# Patient Record
Sex: MALE | Race: WHITE | NOT HISPANIC OR LATINO | Employment: PART TIME | URBAN - METROPOLITAN AREA
[De-identification: names, ages, dates, MRNs, and addresses within clinical notes are randomized per-mention and may not be internally consistent; named-entity substitution may affect disease eponyms.]

---

## 2017-01-04 ENCOUNTER — GENERIC CONVERSION - ENCOUNTER (OUTPATIENT)
Dept: OTHER | Facility: OTHER | Age: 22
End: 2017-01-04

## 2018-06-21 ENCOUNTER — OFFICE VISIT (OUTPATIENT)
Dept: FAMILY MEDICINE CLINIC | Facility: CLINIC | Age: 23
End: 2018-06-21
Payer: COMMERCIAL

## 2018-06-21 VITALS
HEIGHT: 66 IN | DIASTOLIC BLOOD PRESSURE: 70 MMHG | HEART RATE: 69 BPM | TEMPERATURE: 98.1 F | WEIGHT: 139 LBS | BODY MASS INDEX: 22.34 KG/M2 | SYSTOLIC BLOOD PRESSURE: 122 MMHG | OXYGEN SATURATION: 97 %

## 2018-06-21 DIAGNOSIS — F17.210 CIGARETTE NICOTINE DEPENDENCE WITHOUT COMPLICATION: ICD-10-CM

## 2018-06-21 DIAGNOSIS — Z76.89 ESTABLISHING CARE WITH NEW DOCTOR, ENCOUNTER FOR: Primary | ICD-10-CM

## 2018-06-21 DIAGNOSIS — Z87.09 HISTORY OF ASTHMA: ICD-10-CM

## 2018-06-21 DIAGNOSIS — Z87.898 HISTORY OF SEIZURES: ICD-10-CM

## 2018-06-21 PROCEDURE — 99406 BEHAV CHNG SMOKING 3-10 MIN: CPT | Performed by: FAMILY MEDICINE

## 2018-06-21 PROCEDURE — 4004F PT TOBACCO SCREEN RCVD TLK: CPT | Performed by: FAMILY MEDICINE

## 2018-06-21 PROCEDURE — 99203 OFFICE O/P NEW LOW 30 MIN: CPT | Performed by: FAMILY MEDICINE

## 2018-06-21 RX ORDER — TAMSULOSIN HYDROCHLORIDE 0.4 MG/1
CAPSULE ORAL
Refills: 0 | COMMUNITY
Start: 2018-05-23 | End: 2018-06-21

## 2018-06-21 RX ORDER — OXYCODONE HYDROCHLORIDE AND ACETAMINOPHEN 5; 325 MG/1; MG/1
TABLET ORAL
Refills: 0 | COMMUNITY
Start: 2018-05-23 | End: 2018-06-21

## 2018-06-21 NOTE — PATIENT INSTRUCTIONS
Cigarette Smoking and Your Health   WHAT YOU NEED TO KNOW:   What are the risks to my health if I smoke tobacco?  Nicotine and other chemicals found in tobacco damage every cell in your body  Even if you are a light smoker, you have an increased risk for cancer, heart disease, and lung disease  If you are pregnant or have diabetes, smoking increases your risk for complications  What are the benefits to my health if I stop smoking? · You decrease respiratory symptoms such as coughing, wheezing, and shortness of breath  · You reduce your risk for cancers of the lung, mouth, throat, kidney, bladder, pancreas, stomach, and cervix  If you already have cancer, you increase the benefits of chemotherapy  You also reduce your risk for cancer returning or a second cancer from developing  · You reduce your risk for heart disease, blood clots, heart attack, and stroke  · You reduce your risk for lung infections, and diseases such as pneumonia, asthma, chronic bronchitis, and emphysema  · Your circulation improves  More oxygen can be delivered to your body  If you have diabetes, you lower your risk for complications, such as kidney, artery, and eye diseases  You also lower your risk for nerve damage  Nerve damage can lead to amputations, poor vision, and blindness  · You improve your body's ability to heal and to fight infections  What are the health benefits to others if I stop smoking? Tobacco is harmful to nonsmokers who breathe in your secondhand smoke  The following are ways the health of others around you may improve when you stop smoking:  · You lower the risks for lung cancer and heart disease in nonsmoking adults  · If you are pregnant, you lower the risk for miscarriage, early delivery, low birth weight, and stillbirth  You also lower your baby's risk for SIDS, obesity, developmental delay, and neurobehavioral problems, such as ADHD       · If you have children, you lower their risk for ear infections, colds, pneumonia, bronchitis, and asthma  Where can I find more information and support to stop smoking? · [employer]  Phone: 2- 623 - 043-9865  Web Address: www [employer]  CARE AGREEMENT:   You have the right to help plan your care  Learn about your health condition and how it may be treated  Discuss treatment options with your caregivers to decide what care you want to receive  You always have the right to refuse treatment  The above information is an  only  It is not intended as medical advice for individual conditions or treatments  Talk to your doctor, nurse or pharmacist before following any medical regimen to see if it is safe and effective for you  © 2017 [address] Information is for End User's use only and may not be sold, redistributed or otherwise used for commercial purposes  All illustrations and images included in CareNotes® are the copyrighted property of [employer]  or [employer]

## 2018-06-21 NOTE — ASSESSMENT & PLAN NOTE
Also completely asymptomatic without any seizures in years, there were only ever 2 seizures in they were determined to be vasovagal   Unless he has any repeat seizures he does not need anything further regarding this

## 2018-06-21 NOTE — ASSESSMENT & PLAN NOTE
I strongly encouraged that the patient consider quitting smoking  He has thought about it and he thinks he is able to do it  He is not sure he is ready  Given the fact that he had asthma he is even more likely to developed emphysema as an adult  He does not have any symptoms now and the earlier he quits in his life the better he will be from a health standpoint  I advised that he let me know if at any point he wanted to try medication to help with cessation

## 2018-06-21 NOTE — PROGRESS NOTES
Assessment/Plan:    Cigarette nicotine dependence without complication  I strongly encouraged that the patient consider quitting smoking  He has thought about it and he thinks he is able to do it  He is not sure he is ready  Given the fact that he had asthma he is even more likely to developed emphysema as an adult  He does not have any symptoms now and the earlier he quits in his life the better he will be from a health standpoint  I advised that he let me know if at any point he wanted to try medication to help with cessation  History of asthma  Currently completely asymptomatic without need for any medication  History of seizures  Also completely asymptomatic without any seizures in years, there were only ever 2 seizures in they were determined to be vasovagal   Unless he has any repeat seizures he does not need anything further regarding this  Diagnoses and all orders for this visit:    Establishing care with new doctor, encounter for    Cigarette nicotine dependence without complication    History of asthma    History of seizures          Subjective:      Patient ID: Angie Winters is a 25 y o  male      The pt is new to the office here to establish care  He had been seen his pediatrician regularly up until now  He is a generally healthy person without any medical problems not taking any chronic medications  He is a smoker  1/2 ppd  Has quit before - did not smoke for 2 years    He has a history of 2 individual seizures  There is documentation of this in his record through epic as he was worked up at [address]  He saw Neurology and Cardiology  The final diagnosis with that these were vaso-vagal    He had asthma as a kid - last time he needed albuterol was about 4 years ago  Has been fine since then  Has not felt like he needed an inhaler with him for any reason  No seasonal allergies  No cp/sob          The following portions of the patient's history were reviewed and updated as appropriate: allergies, current medications, past family history, past medical history, past social history, past surgical history and problem list     Review of Systems   HENT: Negative for postnasal drip, rhinorrhea and sneezing  Respiratory: Negative for chest tightness and shortness of breath  Cardiovascular: Negative for chest pain  Allergic/Immunologic: Negative for environmental allergies  Neurological: Negative for headaches  Objective:  Vitals:    06/21/18 1042   BP: 122/70   Pulse: 69   Temp: 98 1 °F (36 7 °C)   SpO2: 97%   Weight: 63 kg (139 lb)   Height: 5' 6" (1 676 m)      Physical Exam   Constitutional: He is oriented to person, place, and time  He appears well-developed and well-nourished  HENT:   Head: Normocephalic and atraumatic  Right Ear: External ear normal    Left Ear: External ear normal    Nose: Nose normal    Mouth/Throat: Oropharynx is clear and moist    Eyes: Conjunctivae and EOM are normal  Pupils are equal, round, and reactive to light  Neck: Normal range of motion  Neck supple  Cardiovascular: Normal rate, regular rhythm and normal heart sounds  Pulmonary/Chest: Effort normal and breath sounds normal    Musculoskeletal: Normal range of motion  He exhibits no edema  Neurological: He is alert and oriented to person, place, and time  Skin: Skin is warm, dry and intact  Psychiatric: He has a normal mood and affect  His behavior is normal  Judgment and thought content normal    Nursing note and vitals reviewed

## 2018-09-26 ENCOUNTER — OFFICE VISIT (OUTPATIENT)
Dept: FAMILY MEDICINE CLINIC | Facility: CLINIC | Age: 23
End: 2018-09-26
Payer: COMMERCIAL

## 2018-09-26 VITALS
DIASTOLIC BLOOD PRESSURE: 80 MMHG | BODY MASS INDEX: 21.05 KG/M2 | HEIGHT: 66 IN | TEMPERATURE: 99.1 F | WEIGHT: 131 LBS | OXYGEN SATURATION: 91 % | SYSTOLIC BLOOD PRESSURE: 122 MMHG | HEART RATE: 88 BPM

## 2018-09-26 DIAGNOSIS — J01.00 ACUTE NON-RECURRENT MAXILLARY SINUSITIS: ICD-10-CM

## 2018-09-26 DIAGNOSIS — J45.41 MODERATE PERSISTENT ASTHMA WITH EXACERBATION: Primary | ICD-10-CM

## 2018-09-26 PROCEDURE — 99214 OFFICE O/P EST MOD 30 MIN: CPT | Performed by: FAMILY MEDICINE

## 2018-09-26 PROCEDURE — 3008F BODY MASS INDEX DOCD: CPT | Performed by: FAMILY MEDICINE

## 2018-09-26 RX ORDER — ALBUTEROL SULFATE 90 UG/1
2 AEROSOL, METERED RESPIRATORY (INHALATION) EVERY 6 HOURS PRN
Qty: 8.5 G | Refills: 0 | Status: SHIPPED | OUTPATIENT
Start: 2018-09-26 | End: 2019-09-28

## 2018-09-26 RX ORDER — PREDNISONE 10 MG/1
TABLET ORAL
Qty: 40 TABLET | Refills: 0 | Status: SHIPPED | OUTPATIENT
Start: 2018-09-26 | End: 2019-01-22 | Stop reason: ALTCHOICE

## 2018-09-26 RX ORDER — AMOXICILLIN AND CLAVULANATE POTASSIUM 875; 125 MG/1; MG/1
1 TABLET, FILM COATED ORAL EVERY 12 HOURS SCHEDULED
Qty: 20 TABLET | Refills: 0 | Status: SHIPPED | OUTPATIENT
Start: 2018-09-26 | End: 2018-10-06

## 2018-09-26 NOTE — PATIENT INSTRUCTIONS
Rhinosinusitis   WHAT YOU NEED TO KNOW:   Rhinosinusitis (RS) is inflammation of your nose and sinuses  It commonly begins as a virus, often as a common cold  Viruses usually last 7 to 10 days and do not need treatment  When the virus does not get better on its own, you may have bacterial RS  This means that bacteria have begun to grow inside your sinuses  Acute RS lasts less than 4 weeks  Chronic RS lasts 12 weeks or more  Recurrent RS is when you have 4 or more episodes of RS in one year  DISCHARGE INSTRUCTIONS:   Return to the emergency department if:   · Your eye and eyelid are red, swollen, and painful  · You cannot open your eye  · You have double vision or you cannot see  · Your eyeball bulges out or you cannot move your eye  · You are more sleepy than normal or you notice changes in your ability to think, move, or talk  · You have a stiff neck, a fever, or a bad headache  · You have swelling of your forehead or scalp  Contact your healthcare provider if:   · Your symptoms are worse or do not improve after 3 to 5 days of treatment  · You have questions or concerns about your condition or care  Medicines: You may need any of the following:  · Acetaminophen  decreases pain and fever  It is available without a doctor's order  Ask how much to take and how often to take it  Follow directions  Acetaminophen can cause liver damage if not taken correctly  · NSAIDs , such as ibuprofen, help decrease swelling, pain, and fever  This medicine is available with or without a doctor's order  NSAIDs can cause stomach bleeding or kidney problems in certain people  If you take blood thinner medicine, always ask your healthcare provider if NSAIDs are safe for you  Always read the medicine label and follow directions  · Nasal steroid sprays  decrease inflammation in your nose and sinuses  · Decongestants  reduce swelling and drain mucus in the nose and sinuses   They may help you breathe easier  · Antihistamines  dry mucus in the nose and relieve sneezing  · Antibiotics  treat a bacterial infection and may be needed if your symptoms do not improve or they get worse  · Take your medicine as directed  Contact your healthcare provider if you think your medicine is not helping or if you have side effects  Tell him or her if you are allergic to any medicine  Keep a list of the medicines, vitamins, and herbs you take  Include the amounts, and when and why you take them  Bring the list or the pill bottles to follow-up visits  Carry your medicine list with you in case of an emergency  Self-care:   · Rinse your sinuses  Use a sinus rinse device to rinse your nasal passages with a saline (salt water) solution  This will help thin the mucus in your nose and rinse away pollen and dirt  It will also help reduce swelling so you can breathe normally  Ask your healthcare provider how often to do this  · Breathe in steam   Heat a bowl of water until you see steam  Lean over the bowl and make a tent over your head with a large towel  Breathe deeply for about 20 minutes  Be careful not to get too close to the steam or burn yourself  Do this 3 times a day  You can also breathe deeply when you take a hot shower  · Sleep with your head elevated  Place an extra pillow under your head before you go to sleep to help your sinuses drain  · Drink liquids as directed  Ask your healthcare provider how much liquid to drink each day and which liquids are best for you  Liquids will thin the mucus in your nose and help it drain  Avoid drinks that contain alcohol or caffeine  · Do not smoke, and avoid secondhand smoke  Nicotine and other chemicals in cigarettes and cigars can make your symptoms worse  Ask your healthcare provider for information if you currently smoke and need help to quit  E-cigarettes or smokeless tobacco still contain nicotine   Talk to your healthcare provider before you use these products  Follow up with your healthcare provider as directed: Follow up if your symptoms are worse or not better after 3 to 5 days of treatment  Write down your questions so you remember to ask them during your visits  © 2017 [address] Information is for End User's use only and may not be sold, redistributed or otherwise used for commercial purposes  All illustrations and images included in CareNotes® are the copyrighted property of [employer]  or [employer]  The above information is an  only  It is not intended as medical advice for individual conditions or treatments  Talk to your doctor, nurse or pharmacist before following any medical regimen to see if it is safe and effective for you

## 2018-09-26 NOTE — PROGRESS NOTES
Assessment/Plan:    No problem-specific Assessment & Plan notes found for this encounter  Diagnoses and all orders for this visit:    Moderate persistent asthma with exacerbation  -     albuterol (PROAIR HFA) 90 mcg/act inhaler; Inhale 2 puffs every 6 (six) hours as needed for wheezing  -     predniSONE 10 mg tablet; 5 tabs daily x3 days, 4 tabs daily x2 days, 3 tabs daily x2 days, 2 tabs daily x 2 days, 1 tab daily x2 days      Acute non-recurrent maxillary sinusitis  -     amoxicillin-clavulanate (AUGMENTIN) 875-125 mg per tablet; Take 1 tablet by mouth every 12 (twelve) hours for 10 days    I suspect that the patient has a bacterial sinusitis  I prescribed antibiotics and encouraged medication for sx relief  Rest and fluids encouraged as well  He also is wheezing significantly  I am going to write him for an inhaler as well as a steroid taper  If his symptoms worsen he will let me know or go to the emergency room  Subjective:   Chief Complaint   Patient presents with    Cold Like Symptoms     started on Friday  c/o runny nose, nasal congestion, and persistent cough with green sputum production  Patient ID: Richard Rodriguez is a 25 y o  male      The pt is here because he has been sick for 5 days  + sinus pain/pressure  + ear pain and pressure  + cough  + nasal congestion  + headaches  + PND  no sore throat  No fevers  He is wheezing a bit   He has a history of asthma but has not used an inhaler in about six years          The following portions of the patient's history were reviewed and updated as appropriate: allergies, current medications, past family history, past medical history, past social history, past surgical history and problem list     Review of Systems      Objective:  Vitals:    09/26/18 1316   BP: 122/80   Pulse: 88   Temp: 99 1 °F (37 3 °C)   SpO2: 91%   Weight: 59 4 kg (131 lb)   Height: 5' 6" (1 676 m)      Physical Exam   Constitutional: He is oriented to person, place, and time  Vital signs are normal  He appears well-developed and well-nourished  He does not appear ill  HENT:   Head: Normocephalic and atraumatic  Right Ear: Tympanic membrane, external ear and ear canal normal    Left Ear: Tympanic membrane, external ear and ear canal normal    Nose: Rhinorrhea present  Right sinus exhibits no maxillary sinus tenderness and no frontal sinus tenderness  Left sinus exhibits no maxillary sinus tenderness and no frontal sinus tenderness  Mouth/Throat: Mucous membranes are normal  Posterior oropharyngeal erythema present  No oropharyngeal exudate or posterior oropharyngeal edema  Eyes: Conjunctivae, EOM and lids are normal    Cardiovascular: Normal rate  Pulmonary/Chest: Effort normal  No respiratory distress  He has wheezes (diffuse)  Lymphadenopathy:     He has no cervical adenopathy  Neurological: He is alert and oriented to person, place, and time  Skin: Skin is warm, dry and intact  Psychiatric: He has a normal mood and affect

## 2019-01-22 ENCOUNTER — OFFICE VISIT (OUTPATIENT)
Dept: FAMILY MEDICINE CLINIC | Facility: CLINIC | Age: 24
End: 2019-01-22
Payer: COMMERCIAL

## 2019-01-22 VITALS
BODY MASS INDEX: 20.57 KG/M2 | WEIGHT: 128 LBS | OXYGEN SATURATION: 97 % | SYSTOLIC BLOOD PRESSURE: 86 MMHG | TEMPERATURE: 99 F | HEIGHT: 66 IN | HEART RATE: 76 BPM | DIASTOLIC BLOOD PRESSURE: 60 MMHG

## 2019-01-22 DIAGNOSIS — J01.00 ACUTE NON-RECURRENT MAXILLARY SINUSITIS: Primary | ICD-10-CM

## 2019-01-22 PROCEDURE — 99214 OFFICE O/P EST MOD 30 MIN: CPT | Performed by: FAMILY MEDICINE

## 2019-01-22 RX ORDER — BENZONATATE 200 MG/1
200 CAPSULE ORAL 3 TIMES DAILY PRN
Qty: 20 CAPSULE | Refills: 0 | Status: SHIPPED | OUTPATIENT
Start: 2019-01-22 | End: 2019-03-11 | Stop reason: ALTCHOICE

## 2019-01-22 RX ORDER — CEFUROXIME AXETIL 500 MG/1
500 TABLET ORAL EVERY 12 HOURS SCHEDULED
Qty: 20 TABLET | Refills: 0 | Status: SHIPPED | OUTPATIENT
Start: 2019-01-22 | End: 2019-02-01

## 2019-01-22 NOTE — PROGRESS NOTES
Subjective:   Chief Complaint   Patient presents with    Cough     PT IS HERE FOR COUGHING, RUNNY NOSE AND SORE THROAT X 1 WEEK  Patient ID: Montse Fuentes is a 26 y o  male  The pt is here because he has been sick for 1 week  + sinus pain/pressure  + ear pain and pressure  + cough  + nasal congestion  + headaches  + PND  + sore throat  No fevers        Cough         The following portions of the patient's history were reviewed and updated as appropriate: allergies, current medications, past family history, past medical history, past social history, past surgical history and problem list     Review of Systems   Respiratory: Positive for cough  Objective:  Vitals:    01/22/19 1133   BP: (!) 86/60   Pulse: 76   Temp: 99 °F (37 2 °C)   SpO2: 97%   Weight: 58 1 kg (128 lb)   Height: 5' 6" (1 676 m)      Physical Exam   Constitutional: He is oriented to person, place, and time  Vital signs are normal  He appears well-developed and well-nourished  He appears ill  HENT:   Head: Normocephalic and atraumatic  Right Ear: Tympanic membrane and external ear normal    Left Ear: Tympanic membrane and external ear normal    Nose: Mucosal edema present  No rhinorrhea or sinus tenderness  Right sinus exhibits maxillary sinus tenderness  Right sinus exhibits no frontal sinus tenderness  Left sinus exhibits maxillary sinus tenderness  Left sinus exhibits no frontal sinus tenderness  Mouth/Throat: Mucous membranes are normal  Posterior oropharyngeal erythema present  No oropharyngeal exudate, posterior oropharyngeal edema or tonsillar abscesses  Eyes: Conjunctivae and lids are normal    Pulmonary/Chest: Effort normal and breath sounds normal    Lymphadenopathy:     He has cervical adenopathy  Neurological: He is alert and oriented to person, place, and time  Skin: Skin is warm, dry and intact  Psychiatric: He has a normal mood and affect   Thought content normal          Assessment/Plan:    No problem-specific Assessment & Plan notes found for this encounter  Diagnoses and all orders for this visit:    Acute non-recurrent maxillary sinusitis  -     cefuroxime (CEFTIN) 500 mg tablet; Take 1 tablet (500 mg total) by mouth every 12 (twelve) hours for 10 days  -     benzonatate (TESSALON) 200 MG capsule; Take 1 capsule (200 mg total) by mouth 3 (three) times a day as needed for cough        I suspect that the patient has a bacterial sinusitis  I prescribed antibiotics and encouraged medication for sx relief  Rest and fluids encouraged as well

## 2019-03-11 ENCOUNTER — OFFICE VISIT (OUTPATIENT)
Dept: FAMILY MEDICINE CLINIC | Facility: CLINIC | Age: 24
End: 2019-03-11
Payer: COMMERCIAL

## 2019-03-11 VITALS
BODY MASS INDEX: 20.29 KG/M2 | SYSTOLIC BLOOD PRESSURE: 116 MMHG | HEART RATE: 84 BPM | WEIGHT: 126.25 LBS | HEIGHT: 66 IN | OXYGEN SATURATION: 97 % | TEMPERATURE: 98.5 F | DIASTOLIC BLOOD PRESSURE: 74 MMHG

## 2019-03-11 DIAGNOSIS — Z23 NEED FOR TDAP VACCINATION: ICD-10-CM

## 2019-03-11 DIAGNOSIS — J06.9 VIRAL UPPER RESPIRATORY ILLNESS: Primary | ICD-10-CM

## 2019-03-11 PROCEDURE — 99214 OFFICE O/P EST MOD 30 MIN: CPT | Performed by: FAMILY MEDICINE

## 2019-03-11 PROCEDURE — 3008F BODY MASS INDEX DOCD: CPT | Performed by: FAMILY MEDICINE

## 2019-03-11 PROCEDURE — 90715 TDAP VACCINE 7 YRS/> IM: CPT

## 2019-03-11 PROCEDURE — 4004F PT TOBACCO SCREEN RCVD TLK: CPT | Performed by: FAMILY MEDICINE

## 2019-03-11 PROCEDURE — 90471 IMMUNIZATION ADMIN: CPT

## 2019-03-11 RX ORDER — OSELTAMIVIR PHOSPHATE 75 MG/1
75 CAPSULE ORAL EVERY 12 HOURS SCHEDULED
Qty: 10 CAPSULE | Refills: 0 | Status: SHIPPED | OUTPATIENT
Start: 2019-03-11 | End: 2019-03-16

## 2019-03-11 RX ORDER — BENZONATATE 200 MG/1
200 CAPSULE ORAL 3 TIMES DAILY PRN
Qty: 30 CAPSULE | Refills: 0 | Status: SHIPPED | OUTPATIENT
Start: 2019-03-11 | End: 2019-09-28

## 2019-03-11 NOTE — PROGRESS NOTES
Subjective:   Chief Complaint   Patient presents with    Cold Like Symptoms     pt is here today wiht a runny nose, aches, and a cough producing yellow phlegm  Symptoms started yesterday  Pt did not take any meds  Pt did not have a flu shot this season  Pt had tdap vaccine today and completed depression screening which was negative  Patient ID: Tracy Mitchell is a 26 y o  male  The pt is here because he has been sick for 2 days  It started abruptly yesterday  He has cough, nasal congestion  No sinus pain  No ear pain  He is coughing  No wheezing or SOB  He does have body aches  Thinks he might have had a fever yesterday  He has felt f/c subjectively        The following portions of the patient's history were reviewed and updated as appropriate: allergies, current medications, past family history, past medical history, past social history, past surgical history and problem list     Review of Systems      Objective:  Vitals:    03/11/19 1501   BP: 116/74   Pulse: 84   Temp: 98 5 °F (36 9 °C)   SpO2: 97%   Weight: 57 3 kg (126 lb 4 oz)   Height: 5' 6" (1 676 m)      Physical Exam   Constitutional: Vital signs are normal  He appears well-developed and well-nourished  He does not appear ill  HENT:   Head: Normocephalic and atraumatic  Right Ear: Tympanic membrane, external ear and ear canal normal    Left Ear: Tympanic membrane, external ear and ear canal normal    Nose: Rhinorrhea present  Right sinus exhibits no maxillary sinus tenderness and no frontal sinus tenderness  Left sinus exhibits no maxillary sinus tenderness and no frontal sinus tenderness  Mouth/Throat: Mucous membranes are normal  Posterior oropharyngeal erythema present  No oropharyngeal exudate or posterior oropharyngeal edema  Eyes: Conjunctivae, EOM and lids are normal    Pulmonary/Chest: Effort normal and breath sounds normal    Lymphadenopathy:     He has no cervical adenopathy  Skin: Skin is warm, dry and intact  Assessment/Plan:    No problem-specific Assessment & Plan notes found for this encounter  Diagnoses and all orders for this visit:    Viral upper respiratory illness  -     oseltamivir (TAMIFLU) 75 mg capsule; Take 1 capsule (75 mg total) by mouth every 12 (twelve) hours for 5 days  -     benzonatate (TESSALON) 200 MG capsule; Take 1 capsule (200 mg total) by mouth 3 (three) times a day as needed for cough    I think the patient likely has a viral upper respiratory infection, not necessarily the flu  He does not have a fever today  I did give him a printed prescription for Tamiflu-if he does develop fevers over 101 within the next 24 hours I did advise he can start it  I also refilled his Tessalon Perles for cough      Need for Tdap vaccination  -     TDAP VACCINE GREATER THAN OR EQUAL TO 6YO IM

## 2019-03-11 NOTE — PATIENT INSTRUCTIONS
Influenza   WHAT YOU NEED TO KNOW:   Influenza (the flu) is an infection caused by the influenza virus  The flu is easily spread when an infected person coughs, sneezes, or has close contact with others  You may be able to spread the flu to others for 1 week or longer after signs or symptoms appear  DISCHARGE INSTRUCTIONS:   Call 911 for any of the following:   · You have trouble breathing, and your lips look purple or blue  · You have a seizure  Return to the emergency department if:   · You are dizzy, or you are urinating less or not at all  · You have a headache with a stiff neck, and you feel tired or confused  · You have new pain or pressure in your chest     · Your symptoms, such as shortness of breath, vomiting, or diarrhea, get worse  · Your symptoms, such as fever and coughing, seem to get better, but then get worse  Contact your healthcare provider if:   · You have new muscle pain or weakness  · You have questions or concerns about your condition or care  Medicines: You may need any of the following:  · Acetaminophen  decreases pain and fever  It is available without a doctor's order  Ask how much to take and how often to take it  Follow directions  Acetaminophen can cause liver damage if not taken correctly  · NSAIDs , such as ibuprofen, help decrease swelling, pain, and fever  This medicine is available with or without a doctor's order  NSAIDs can cause stomach bleeding or kidney problems in certain people  If you take blood thinner medicine, always ask your healthcare provider if NSAIDs are safe for you  Always read the medicine label and follow directions  · Antivirals  help fight a viral infection  · Take your medicine as directed  Contact your healthcare provider if you think your medicine is not helping or if you have side effects  Tell him or her if you are allergic to any medicine  Keep a list of the medicines, vitamins, and herbs you take   Include the amounts, and when and why you take them  Bring the list or the pill bottles to follow-up visits  Carry your medicine list with you in case of an emergency  Rest  as much as you can to help you recover  Drink liquids as directed  to help prevent dehydration  Ask how much liquid to drink each day and which liquids are best for you  Prevent the spread of influenza:   · Wash your hands often  Use soap and water  Wash your hands after you use the bathroom, change a child's diapers, or sneeze  Wash your hands before you prepare or eat food  Use gel hand cleanser when soap and water are not available  Do not touch your eyes, nose, or mouth unless you have washed your hands first            · Cover your mouth when you sneeze or cough  Cough into a tissue or the bend of your arm  · Clean shared items with a germ-killing   Clean table surfaces, doorknobs, and light switches  Do not share towels, silverware, and dishes with people who are sick  Wash bed sheets, towels, silverware, and dishes with soap and water  · Wear a mask  over your mouth and nose if you are sick or are near anyone who is sick  · Stay away from others  if you are sick  · Influenza vaccine  helps prevent influenza (flu)  Everyone older than 6 months should get a yearly influenza vaccine  Get the vaccine as soon as it is available, usually in September or October each year  Follow up with your healthcare provider as directed:  Write down your questions so you remember to ask them during your visits  © 2017 [address] Information is for End User's use only and may not be sold, redistributed or otherwise used for commercial purposes  All illustrations and images included in CareNotes® are the copyrighted property of [employer] , [employer]  or [employer] Hermosillo  The above information is an  only  It is not intended as medical advice for individual conditions or treatments   Talk to your doctor, nurse or pharmacist before following any medical regimen to see if it is safe and effective for you  Upper Respiratory Infection, Ambulatory Care   GENERAL INFORMATION:   An upper respiratory infection  is also called a common cold  It can affect your nose, throat, ears, and sinuses  Common symptoms include the following:   · Runny or stuffy nose    · Sneezing and coughing    · Sore throat or hoarseness    · Red, watery, and sore eyes    · Tiredness or restlessness    · Chills and fever    · Headache, body aches, or sore muscles  Seek immediate care for the following symptoms:   · Headaches or a stiff neck    · Bright lights hurt your eyes    · Chest pain or trouble breathing  Treatment for an upper respiratory infection  may include any of the following:  · Decongestants  help decrease nasal congestion and improve your breathing  Do not use decongestant sprays for more than a few days  · Cough suppressants  help decrease coughing  Ask your healthcare provider which type of cough medicine is best for you  Some cough medicines need a doctor's order  · NSAIDs  help decrease swelling and pain or fever  This medicine is available with or without a doctor's order  NSAIDs can cause stomach bleeding or kidney problems in certain people  If you take blood thinner medicine, always ask your healthcare provider if NSAIDs are safe for you  Always read the medicine label and follow directions  Care for an upper respiratory infection:   · Rest  until your fever is gone or you feel better  · Drink liquids as directed to prevent dehydration  You may need to drink 8 to 10 cups of liquid each day  Good liquids to drink include water, ginger ale, tea, or fruit juices  · Gargle  with warm salt water to help your sore throat feel better  Mix ¼ teaspoon salt with 1 cup warm water  You may also suck on hard candy or throat lozenges  · Saline nasal drops  help loosen your nasal congestion   They can be bought without a doctor's order     · Take a warm bath or shower  to help decrease body aches and help you breathe easier  · Use a cool-mist humidifier  to increase air moisture and make it easier for you to breathe  Prevent the spread of germs:   · Avoid others for the first 2 to 3 days of your cold  Germs are easily spread during this time  · Do not share food, drinks,  towels, or personal items with others  · Wash your hands often  Use soap and water  Wash your hands after you use the bathroom, change a child's diapers, or sneeze  Wash your hands before you prepare or eat food  Cover your mouth and nose with a tissue when you sneeze or cough  Follow up with your healthcare provider as directed:  Write down your questions so you remember to ask them during your visits  CARE AGREEMENT:   You have the right to help plan your care  Learn about your health condition and how it may be treated  Discuss treatment options with your caregivers to decide what care you want to receive  You always have the right to refuse treatment  The above information is an  only  It is not intended as medical advice for individual conditions or treatments  Talk to your doctor, nurse or pharmacist before following any medical regimen to see if it is safe and effective for you  © 2014 [address] is for End User's use only and may not be sold, redistributed or otherwise used for commercial purposes  All illustrations and images included in CareNotes® are the copyrighted property of A D A M [employer]  or Silver[employer] Hermosillo

## 2019-07-02 ENCOUNTER — OFFICE VISIT (OUTPATIENT)
Dept: FAMILY MEDICINE CLINIC | Facility: CLINIC | Age: 24
End: 2019-07-02
Payer: COMMERCIAL

## 2019-07-02 VITALS
WEIGHT: 125.5 LBS | SYSTOLIC BLOOD PRESSURE: 110 MMHG | HEART RATE: 91 BPM | OXYGEN SATURATION: 97 % | HEIGHT: 66 IN | TEMPERATURE: 98.9 F | DIASTOLIC BLOOD PRESSURE: 50 MMHG | BODY MASS INDEX: 20.17 KG/M2

## 2019-07-02 DIAGNOSIS — J02.9 ACUTE VIRAL PHARYNGITIS: Primary | ICD-10-CM

## 2019-07-02 LAB — S PYO AG THROAT QL: NEGATIVE

## 2019-07-02 PROCEDURE — 3008F BODY MASS INDEX DOCD: CPT | Performed by: FAMILY MEDICINE

## 2019-07-02 PROCEDURE — 4004F PT TOBACCO SCREEN RCVD TLK: CPT | Performed by: FAMILY MEDICINE

## 2019-07-02 PROCEDURE — 87880 STREP A ASSAY W/OPTIC: CPT | Performed by: FAMILY MEDICINE

## 2019-07-02 PROCEDURE — 99213 OFFICE O/P EST LOW 20 MIN: CPT | Performed by: FAMILY MEDICINE

## 2019-07-02 RX ORDER — PREDNISONE 10 MG/1
TABLET ORAL
Qty: 20 TABLET | Refills: 0 | Status: SHIPPED | OUTPATIENT
Start: 2019-07-02 | End: 2019-09-27 | Stop reason: HOSPADM

## 2019-07-02 NOTE — PROGRESS NOTES
Subjective:   Chief Complaint   Patient presents with    Sore Throat     pt here today with sore throat for the past 4 days  Pt said he took cough medicine and that has been helping, but when it wears off, his sore throat comes back  Pt did have some aches yesterday  Patient ID: Summer Bashir is a 26 y o  male  The patient is here today because he has had a very sore throat for 3 days  no fevers  Hurts to swallow  no cough  + achy and fatigued  Feels sick  Does not feel like allergies/PND        The following portions of the patient's history were reviewed and updated as appropriate: allergies, current medications, past family history, past medical history, past social history, past surgical history and problem list     Review of Systems      Objective:  Vitals:    07/02/19 1017   BP: 110/50   Pulse: 91   Temp: 98 9 °F (37 2 °C)   SpO2: 97%   Weight: 56 9 kg (125 lb 8 oz)   Height: 5' 6" (1 676 m)      Physical Exam   Constitutional: He appears well-developed and well-nourished  He does not appear ill  No distress  HENT:   Mouth/Throat: Mucous membranes are normal  Posterior oropharyngeal edema and posterior oropharyngeal erythema present  No oropharyngeal exudate or tonsillar abscesses  Tonsils are 2+ on the right  Tonsils are 2+ on the left  No tonsillar exudate  Skin: Skin is warm and dry  No rash noted  Assessment/Plan:    No problem-specific Assessment & Plan notes found for this encounter  Diagnoses and all orders for this visit:    Acute viral pharyngitis  -     POCT rapid strepA  -     predniSONE 10 mg tablet; 4 tabs daily x2 days, 3 tabs daily x2 days, 2 tabs daily x 2 days, 1 tab daily x2 days        The patient has no fevers and his rapid strep is negative  I suspect this is viral in nature and put him on prednisone to help with his symptoms  If things change, especially if he develops fevers, he will call and let me know

## 2019-07-02 NOTE — PATIENT INSTRUCTIONS

## 2019-09-26 ENCOUNTER — HOSPITAL ENCOUNTER (INPATIENT)
Facility: HOSPITAL | Age: 24
LOS: 1 days | Discharge: HOME | DRG: 087 | End: 2019-09-27
Attending: SURGERY | Admitting: SURGERY
Payer: COMMERCIAL

## 2019-09-26 DIAGNOSIS — S06.5X9A SUBDURAL HEMATOMA, ACUTE (HCC): ICD-10-CM

## 2019-09-26 DIAGNOSIS — S01.312A: Primary | ICD-10-CM

## 2019-09-26 DIAGNOSIS — S02.119A OCCIPITAL FRACTURE (HCC): ICD-10-CM

## 2019-09-26 PROCEDURE — 99223 1ST HOSP IP/OBS HIGH 75: CPT | Performed by: SURGERY

## 2019-09-26 PROCEDURE — 99285 EMERGENCY DEPT VISIT HI MDM: CPT

## 2019-09-26 PROCEDURE — NC001 PR NO CHARGE: Performed by: SURGERY

## 2019-09-26 PROCEDURE — 12051 INTMD RPR FACE/MM 2.5 CM/<: CPT | Performed by: SURGERY

## 2019-09-26 PROCEDURE — 0HQ3XZZ REPAIR LEFT EAR SKIN, EXTERNAL APPROACH: ICD-10-PCS | Performed by: SURGERY

## 2019-09-26 RX ORDER — ACETAMINOPHEN 325 MG/1
975 TABLET ORAL EVERY 8 HOURS SCHEDULED
Status: DISCONTINUED | OUTPATIENT
Start: 2019-09-27 | End: 2019-09-27 | Stop reason: HOSPADM

## 2019-09-26 RX ORDER — ONDANSETRON 2 MG/ML
4 INJECTION INTRAMUSCULAR; INTRAVENOUS EVERY 4 HOURS PRN
Status: DISCONTINUED | OUTPATIENT
Start: 2019-09-26 | End: 2019-09-27 | Stop reason: HOSPADM

## 2019-09-26 RX ORDER — OXYCODONE HYDROCHLORIDE 5 MG/1
5 TABLET ORAL EVERY 4 HOURS PRN
Status: DISCONTINUED | OUTPATIENT
Start: 2019-09-26 | End: 2019-09-27 | Stop reason: HOSPADM

## 2019-09-26 RX ORDER — OXYCODONE HYDROCHLORIDE 10 MG/1
10 TABLET ORAL EVERY 4 HOURS PRN
Status: DISCONTINUED | OUTPATIENT
Start: 2019-09-26 | End: 2019-09-27 | Stop reason: HOSPADM

## 2019-09-26 RX ORDER — LIDOCAINE HYDROCHLORIDE 10 MG/ML
5 INJECTION, SOLUTION EPIDURAL; INFILTRATION; INTRACAUDAL; PERINEURAL ONCE
Status: COMPLETED | OUTPATIENT
Start: 2019-09-26 | End: 2019-09-26

## 2019-09-26 RX ORDER — NICOTINE 21 MG/24HR
1 PATCH, TRANSDERMAL 24 HOURS TRANSDERMAL DAILY
Status: DISCONTINUED | OUTPATIENT
Start: 2019-09-27 | End: 2019-09-26 | Stop reason: SDUPTHER

## 2019-09-26 RX ORDER — NICOTINE 21 MG/24HR
21 PATCH, TRANSDERMAL 24 HOURS TRANSDERMAL DAILY
Status: DISCONTINUED | OUTPATIENT
Start: 2019-09-26 | End: 2019-09-27 | Stop reason: HOSPADM

## 2019-09-26 RX ORDER — HYDROMORPHONE HCL/PF 1 MG/ML
0.5 SYRINGE (ML) INJECTION
Status: DISCONTINUED | OUTPATIENT
Start: 2019-09-26 | End: 2019-09-27 | Stop reason: HOSPADM

## 2019-09-26 RX ORDER — ACETAMINOPHEN 325 MG/1
975 TABLET ORAL ONCE
Status: COMPLETED | OUTPATIENT
Start: 2019-09-26 | End: 2019-09-26

## 2019-09-26 RX ADMIN — NICOTINE 21 MG: 21 PATCH, EXTENDED RELEASE TRANSDERMAL at 22:03

## 2019-09-26 RX ADMIN — ACETAMINOPHEN 975 MG: 325 TABLET ORAL at 21:15

## 2019-09-26 RX ADMIN — LIDOCAINE HYDROCHLORIDE 5 ML: 10 INJECTION, SOLUTION EPIDURAL; INFILTRATION; INTRACAUDAL; PERINEURAL at 21:16

## 2019-09-27 ENCOUNTER — TRANSITIONAL CARE MANAGEMENT (OUTPATIENT)
Dept: FAMILY MEDICINE CLINIC | Facility: CLINIC | Age: 24
End: 2019-09-27

## 2019-09-27 VITALS
SYSTOLIC BLOOD PRESSURE: 118 MMHG | TEMPERATURE: 98.6 F | HEART RATE: 71 BPM | BODY MASS INDEX: 19.61 KG/M2 | HEIGHT: 66 IN | OXYGEN SATURATION: 98 % | DIASTOLIC BLOOD PRESSURE: 58 MMHG | RESPIRATION RATE: 18 BRPM | WEIGHT: 122 LBS

## 2019-09-27 PROBLEM — S06.5XAA SUBDURAL HEMATOMA (HCC): Status: ACTIVE | Noted: 2019-09-27

## 2019-09-27 PROBLEM — S02.119A OCCIPITAL FRACTURE (HCC): Status: ACTIVE | Noted: 2019-09-27

## 2019-09-27 PROBLEM — S01.312A COMPLEX LACERATION OF LEFT EAR: Status: ACTIVE | Noted: 2019-09-27

## 2019-09-27 PROBLEM — S06.5X9A SUBDURAL HEMATOMA (HCC): Status: ACTIVE | Noted: 2019-09-27

## 2019-09-27 LAB
ANION GAP SERPL CALCULATED.3IONS-SCNC: 6 MMOL/L (ref 4–13)
BUN SERPL-MCNC: 10 MG/DL (ref 5–25)
CALCIUM SERPL-MCNC: 8.4 MG/DL (ref 8.3–10.1)
CHLORIDE SERPL-SCNC: 111 MMOL/L (ref 100–108)
CO2 SERPL-SCNC: 24 MMOL/L (ref 21–32)
CREAT SERPL-MCNC: 0.65 MG/DL (ref 0.6–1.3)
ERYTHROCYTE [DISTWIDTH] IN BLOOD BY AUTOMATED COUNT: 13 % (ref 11.6–15.1)
GFR SERPL CREATININE-BSD FRML MDRD: 138 ML/MIN/1.73SQ M
GLUCOSE SERPL-MCNC: 86 MG/DL (ref 65–140)
HCT VFR BLD AUTO: 39.4 % (ref 36.5–49.3)
HGB BLD-MCNC: 13 G/DL (ref 12–17)
MCH RBC QN AUTO: 29.8 PG (ref 26.8–34.3)
MCHC RBC AUTO-ENTMCNC: 33 G/DL (ref 31.4–37.4)
MCV RBC AUTO: 90 FL (ref 82–98)
PLATELET # BLD AUTO: 216 THOUSANDS/UL (ref 149–390)
PMV BLD AUTO: 9.5 FL (ref 8.9–12.7)
POTASSIUM SERPL-SCNC: 3.7 MMOL/L (ref 3.5–5.3)
RBC # BLD AUTO: 4.36 MILLION/UL (ref 3.88–5.62)
SODIUM SERPL-SCNC: 141 MMOL/L (ref 136–145)
WBC # BLD AUTO: 11.05 THOUSAND/UL (ref 4.31–10.16)

## 2019-09-27 PROCEDURE — NC001 PR NO CHARGE: Performed by: SURGERY

## 2019-09-27 PROCEDURE — G8988 SELF CARE GOAL STATUS: HCPCS

## 2019-09-27 PROCEDURE — G8989 SELF CARE D/C STATUS: HCPCS

## 2019-09-27 PROCEDURE — 80048 BASIC METABOLIC PNL TOTAL CA: CPT | Performed by: EMERGENCY MEDICINE

## 2019-09-27 PROCEDURE — 99255 IP/OBS CONSLTJ NEW/EST HI 80: CPT | Performed by: PHYSICIAN ASSISTANT

## 2019-09-27 PROCEDURE — 97163 PT EVAL HIGH COMPLEX 45 MIN: CPT

## 2019-09-27 PROCEDURE — G8987 SELF CARE CURRENT STATUS: HCPCS

## 2019-09-27 PROCEDURE — 97166 OT EVAL MOD COMPLEX 45 MIN: CPT

## 2019-09-27 PROCEDURE — 99238 HOSP IP/OBS DSCHRG MGMT 30/<: CPT | Performed by: SURGERY

## 2019-09-27 PROCEDURE — G8978 MOBILITY CURRENT STATUS: HCPCS

## 2019-09-27 PROCEDURE — G8980 MOBILITY D/C STATUS: HCPCS

## 2019-09-27 PROCEDURE — G8979 MOBILITY GOAL STATUS: HCPCS

## 2019-09-27 PROCEDURE — 85027 COMPLETE CBC AUTOMATED: CPT | Performed by: EMERGENCY MEDICINE

## 2019-09-27 RX ORDER — GINSENG 100 MG
1 CAPSULE ORAL 2 TIMES DAILY
Status: DISCONTINUED | OUTPATIENT
Start: 2019-09-27 | End: 2019-09-27 | Stop reason: HOSPADM

## 2019-09-27 RX ORDER — ACETAMINOPHEN 325 MG/1
975 TABLET ORAL EVERY 8 HOURS SCHEDULED
Qty: 30 TABLET | Refills: 0 | Status: SHIPPED | OUTPATIENT
Start: 2019-09-27 | End: 2019-10-09 | Stop reason: CLARIF

## 2019-09-27 RX ORDER — LEVETIRACETAM 500 MG/1
500 TABLET ORAL EVERY 12 HOURS SCHEDULED
Qty: 14 TABLET | Refills: 0 | Status: SHIPPED | OUTPATIENT
Start: 2019-09-27 | End: 2019-10-09 | Stop reason: CLARIF

## 2019-09-27 RX ADMIN — BACITRACIN ZINC 1 SMALL APPLICATION: 500 OINTMENT TOPICAL at 08:45

## 2019-09-27 RX ADMIN — ACETAMINOPHEN 975 MG: 325 TABLET ORAL at 05:35

## 2019-09-27 NOTE — ASSESSMENT & PLAN NOTE
Outside imaging personally reviewed and reviewed with attendings:  · CT head: left occipital nondisplaced skull fracture  · STAT CT head without contrast if decline in GCS >2pts/1h  · Pain control per primary team  · No neurosurgical intervention

## 2019-09-27 NOTE — PHYSICAL THERAPY NOTE
PHYSICAL THERAPY EVALUATION  NAME:  Lars Kirkland  DATE: 09/27/19    AGE:   26 y o  Mrn:   [id number]  ADMIT DX:  Occipital fracture ([facility] ) [S31 186H]  Subdural hematoma, acute ([facility] ) [B44 2Z4P]  Laceration of left ear [S01 312A]  Unspecified multiple injuries, initial encounter [T07  XXXA]    Past Medical History:   Diagnosis Date    Asthma     Lyme disease     Lyme disease     Personal history of methicillin resistant Staphylococcus aureus     Seizures ([facility] )     childhood       Past Surgical History:   Procedure Laterality Date    ANKLE SURGERY      ORIF ANKLE FRACTURE BIMALLEOLAR         Length Of Stay: 1    PHYSICAL THERAPY EVALUATION:        09/27/19 1036   Note Type   Note type Eval only   Pain Assessment   Pain Assessment No/denies pain   Home Living   Type of [address] [place] [address] One level;Stairs to enter with rails  (1 YADIRA )   Home Equipment   (none as per pt )   Additional Comments Pt reports living with parents and family who are able to assist pt if needed    Prior Function   Level of [place] Independent with ADLs and functional mobility   Lives With [facility] Help From Family;Friend(s)   ADL Assistance Independent   Falls in the last 6 months   (1 from tree stand )   Comments Pt denies the use of an AD for ambulation PTA    Restrictions/Precautions   Weight Bearing Precautions Per Order No   Other Precautions Multiple lines   Cognition   Overall Cognitive Status WFL   Arousal/Participation Alert   Orientation Level Oriented to person;Oriented to place;Oriented to time   Memory Within functional limits   Following Commands Follows all commands and directions without difficulty   RUE Assessment   RUE Assessment WFL   LUE Assessment   LUE Assessment WFL   RLE Assessment   RLE Assessment WFL   Strength RLE   RLE Overall Strength 5/5   LLE Assessment   LLE Assessment WFL   Strength LLE   LLE Overall Strength 5/5   Bed Mobility   Supine to Sit 7  Independent   Transfers   Sit to Stand 7  Independent   Stand to Sit 7  Independent   Ambulation/Elevation   Gait pattern Foward flexed   Gait Assistance 7  Independent   Balance   Static Sitting Normal   Static Standing Good   Ambulatory Good   Endurance Deficit   Endurance Deficit No   Activity Tolerance   Activity Tolerance Patient tolerated treatment well   Nurse Made Aware Pt appropriate to be seen and mobilize per nsg    Assessment   Prognosis Good   Problem List Decreased endurance   Assessment Pt is 26 y o  male seen for PT evaluation s/p admit to [facility] on 9/26/2019  Two pt identifiers were used to confirm  Pt presented s/p fall from tree stand  Pt ambulated back to home and had an unwitnessed loss of consciousness   Pt was admitted with a primary dx of: SDH, occipital fracture  Pt originally presented to [facility] however was transferred [facility] for further medical management   PT now consulted for assessment of mobility and d/c needs  Pt with Up in chair orders  Pts current co morbidities effecting treatment include: asthma, lyme disease, seizures, and personal factors including YADIRA home  Pts current clinical presentation is Evolving (medium complexity) due to Decreased activity tolerance compared to baseline, Continuous pulse oximetry monitoring     Prior to admission, pt was I with ambulation without the use of an AD as per pt  Upon evaluation, pt currently is requiring I for bed mobility; I for transfers and I for ambulation w/ no AD   Pt denies any lightheadedness or dizziness with ambulation  Pt presents at PT eval functioning below baseline and currently w/ overall mobility deficits 2* to: decreased endurance  At conclusion of PT session pt returned BTB with phone and call bell within reach  Pt denies any further questions at this time  PT is currently recommending home with family suopport  Pt/ family agreeable to plan and goals as stated on evaluation   D/C acute care PT at this time due to pt being at/ near baseline in terms of functional mobility  Pt denies any mobility or safety concerns     Barriers to Discharge None   Barriers to Discharge Comments Pt denies any mobility or safety concerns at d/c    Goals   Patient Goals " to go home"   Recommendation   Recommendation Home with family support   Equipment Recommended   (none )   PT - OK to Discharge Yes  (when medically cleared )   Modified [place] Scale   Modified [place] Scale 1   Barthel Index   Feeding 10   Bathing 5   Grooming Score 5   Dressing Score 10   Bladder Score 10   Bowels Score 10   Toilet Use Score 10   Transfers (Bed/Chair) Score 15   Mobility (Level Surface) Score 15   Stairs Score 10   Barthel Index Score 100   Allison Hickey, PT

## 2019-09-27 NOTE — UTILIZATION REVIEW
Initial Clinical Review    Admission: Date/Time/Statement: Inpatient Admission Orders (From admission, onward)     Ordered        09/26/19 2252  Inpatient Admission  Once                   Orders Placed This Encounter   Procedures    Inpatient Admission     Standing Status:   Standing     Number of Occurrences:   1     Order Specific Question:   Admitting Physician     Answer:   Bridgette Munoz     Order Specific Question:   Level of Care     Answer:   Level 2 Stepdown / HOT [14]     Order Specific Question:   Estimated length of stay     Answer:   More than 2 Midnights     Order Specific Question:   Certification     Answer:   I certify that inpatient services are medically necessary for this patient for a duration of greater than two midnights  See H&P and MD Progress Notes for additional information about the patient's course of treatment  ED Arrival Information     Expected Arrival Acuity Means of Arrival Escorted By Service Admission Type    9/26/2019 17:41 9/26/2019 20:04 Immediate Ambulance [facility] Trauma Urgent    Arrival Complaint    fall, occipital bone fx's, subdural hemorrhage         Chief Complaint   Patient presents with    Trauma     reference trauma charting     Assessment/Plan:   23y male to ED, transfer from [facility] for S/P Fall and Headache  He fell 8ft from a tree while hunting  Recalls event, did strike his head on landing and unwitnessed LOC  He found confused by grandfather  Pt diaphoretic and emesis x2 on route  Pt found with occipital fracture and subdural hematoma, thus transferred to [facility] for Neurosurgery evaluation  Admit Inpatient level of care for Subdural Hematoma, Occipital fracture and Left ear laceration   Stepdown High Observation Trauma Bed, Neurosurgery consult, frequent neuro checks, continue C-collar, pain control and laceration repair  9/27 Progress notes; pain control, Neurosurgery consult pend  9/27 Neurosurgery cons; STAT CT head without contrast if decline in GCS >2pts/1h or repeat scan if chemical DVT ppx is warranted  No neurosurgerical intervention   Defer Keppra use and pain control    GCS = 15    ED Triage Vitals   Temperature Pulse Respirations Blood Pressure SpO2   09/26/19 2009 09/26/19 2009 09/26/19 2009 09/26/19 2009 09/26/19 2009   98 4 °F (36 9 °C) 60 18 120/57 97 %      Temp Source Heart Rate Source Patient Position - Orthostatic VS BP Location FiO2 (%)   09/26/19 2009 09/26/19 2009 09/26/19 2009 09/27/19 0009 --   Oral Monitor Sitting Right arm       Pain Score       09/26/19 2009       7        Wt Readings from Last 1 Encounters:   09/27/19 55 3 kg (122 lb)     Additional Vital Signs:   09/27/19 08:20:49  98 6 °F (37 °C)  71  18  118/58  78  98 %      09/27/19 03:07:07  98 8 °F (37 1 °C)  77  18  118/60  79  96 %  None (Room air)    09/27/19 00:09:18  99 °F (37 2 °C)  63  18  105/73  84  98 %  None (Room air)    09/27/19 0004              None (Room air)    09/26/19 2300    63  21  105/51    98 %      09/26/19 2200    70  25Abnormal   120/59    99 %        Pertinent Labs/Diagnostic Test Results:   9/26 CT Head - CT head with minimal anterior falx SDH    Results from last 7 days   Lab Units 09/27/19  0516   WBC Thousand/uL 11 05*   HEMOGLOBIN g/dL 13 0   HEMATOCRIT % 39 4   PLATELETS Thousands/uL 216         Results from last 7 days   Lab Units 09/27/19  0516   SODIUM mmol/L 141   POTASSIUM mmol/L 3 7   CHLORIDE mmol/L 111*   CO2 mmol/L 24   ANION GAP mmol/L 6   BUN mg/dL 10   CREATININE mg/dL 0 65   EGFR ml/min/1 73sq m 138   CALCIUM mg/dL 8 4     Results from last 7 days   Lab Units 09/27/19  0516   GLUCOSE RANDOM mg/dL 86     ED Treatment:   Medication Administration from 09/26/2019 1741 to 09/26/2019 2348       Date/Time Order Dose Route Action     09/26/2019 2115 acetaminophen (TYLENOL) tablet 975 mg 975 mg Oral Given     09/26/2019 2203 nicotine (NICODERM CQ) 21 mg/24 hr TD 24 hr patch 21 mg 21 mg Transdermal Medication Applied     09/26/2019 2116 lidocaine (PF) (XYLOCAINE-MPF) 1 % injection 5 mL 5 mL Infiltration Given        Past Medical History:   Diagnosis Date    Asthma     Lyme disease     Lyme disease     Personal history of methicillin resistant Staphylococcus aureus     Seizures ([facility] [address] )     childhood     Present on Admission:   Cigarette nicotine dependence without complication    Admitting Diagnosis: Occipital fracture ([facility] ) [S02 119A]  Subdural hematoma, acute ([facility] ) [S06 5X9A]  Laceration of left ear [S01 312A]  Unspecified multiple injuries, initial encounter [T07  XXXA]     Age/Sex: 26 y o  male     Admission Orders:  IP CONSULT TO NEUROSURGERY  Neurological checks q1h    Current Facility-Administered Medications:  acetaminophen 975 mg Oral Q8H [address]   bacitracin 1 small application Topical BID   HYDROmorphone 0 5 mg Intravenous Q1H PRN   nicotine 21 mg Transdermal Daily   nicotine polacrilex 2 mg Oral Q2H PRN   ondansetron 4 mg Intravenous Q4H PRN   oxyCODONE 10 mg Oral Q4H PRN   oxyCODONE 5 mg Oral Q4H PRN     Network Utilization Review Department  Phone: [phone]; Fax [phone]  [email or web address]  ATTENTION: Please call with any questions or concerns to [phone]  and carefully listen to the prompts so that you are directed to the right person  Send all requests for admission clinical reviews, approved or denied determinations and any other requests to fax [phone]   All voicemails are confidential

## 2019-09-27 NOTE — DISCHARGE INSTRUCTIONS
Neurosurgery discharge instructions following traumatic head bleed:      Do not take any blood thinning medications for at least 2 weeks (ie  No Advil  No motrin  No ibuprofen  No Aleve  No Aspirin  No fishoil  No heparin  No antiplatelet / no anticoagulation medication)   Refrain from activity that increases chance of trauma to head or falls  Recommend you take fall precaution   No strenuous activity or sports  No heavy lifting (nothing more than 5-10 lbs)   Return to hospital Emergency Room if you experience worsening / new headache, nausea/vomiting, speech/vision change, seizure, confusion / mental status change, weakness, or other neurological changes  Laceration Care:   Seek medical attn if you develop fevers/chills/sweats or increased redness, swelling or drainage from the wound  Wash wound daily, gently with warm, soapy water  Do not scrub  Pat dry with clean towel  Do not immerse the wound in water (ie  No tub baths or swimming pools) until cleared by trauma     Follow up as directed for suture removal    Appointment is on 10/10/19 at 2:30pm

## 2019-09-27 NOTE — DISCHARGE SUMMARY
Discharge Summary - Jorge Zamudio 26 y o  male MRN: [id number]    Unit/Bed#: [facility] 628-01 Encounter: [id number]    Admission Date:   Admission Orders (From admission, onward)     Ordered        09/26/19 2252  Inpatient Admission  Once                     Admitting Diagnosis: Occipital fracture ([address] ) [S02 119A]  Subdural hematoma, acute ([address] ) [S06 5X9A]  Laceration of left ear [S01 312A]  Unspecified multiple injuries, initial encounter [T07  XXXA]    HPI: Per Kwadwo Bustillo, "Jorge Zamudio is a 26 y o  male who presents as a transfer from [facility]   At approximately 3:30 p m  Patient fell approximately 8 feet from a tree stand while hunting  He remembers all the events of the fall  He struck his head when he landed  He initially walked home approximately 100 yd  He then had an unwitnessed loss of consciousness  He was found on the ground confused by his grandfather  He went to [facility] for evaluation  He was diaphoretic and vomited twice in route  He was found have an occipital fracture and a subdural hematoma  He was transferred here for neurosurgical evaluation  Patient complains of an occipital headache  He has no other complaints  Denies nausea  Denies numbness, tingling, weakness in extremities  Patient is acting at his neurologic baseline per family, who is present  Mechanism:Fall"       Procedures Performed:   Orders Placed This Encounter   Procedures    Laceration repair       Summary of Hospital Course:  Patient is a 28-year-old male who comes in status post fall  He was noted to have a possible subdural hematoma  He was consulted to Neurosurgery  He was started on a course of Keppra for total of 7 days at 500 mg b i d  He was discharged without needing neurosurgery follow-up  He was given discharge instructions regarding a laceration that was repaired to be seen in the trauma clinic for his left ear  He was discharged in stable condition    He was able to clear PT and OT  He did not require any outpatient resources  Significant Findings, Care, Treatment and Services Provided: No results found  Complications: no complications    Discharge Diagnosis:   Patient Active Problem List   Diagnosis    Cigarette nicotine dependence without complication    History of seizures    History of asthma    Occipital fracture (HCC)    Subdural hematoma (HCC)    Complex laceration of left ear         Resolved Problems  Date Reviewed: 9/27/2019    None          Condition at Discharge: good         Discharge instructions/Information to patient and family:   See after visit summary for information provided to patient and family  Provisions for Follow-Up Care:  See after visit summary for information related to follow-up care and any pertinent home health orders  PCP: Jacky Moore MD    Disposition: Home    Planned Readmission: No      Discharge Statement   I spent 23 minutes discharging the patient  This time was spent on the day of discharge  I had direct contact with the patient on the day of discharge  Additional documentation is required if more than 30 minutes were spent on discharge  Discharge Medications:  See after visit summary for reconciled discharge medications provided to patient and family

## 2019-09-27 NOTE — CONSULTS
Consult- Stacy Amanda 1995, 26 y o  male MRN: [id number]    Unit/Bed#: [facility] 628-01 Encounter: [id number]    Primary Care Provider: Jamar Quinteros MD   Date and time admitted to hospital: 9/26/2019  8:04 PM      Inpatient consult to Neurosurgery  Consult performed by: Lucille Allen PA-C  Consult ordered by: Ina Crowe MD          * Subdural hematoma [facility])  Assessment & Plan  Outside imaging personally reviewed and reviewed with attendings:  · CT head with minimal anterior falx SDH  · STAT CT head without contrast if decline in GCS >2pts/1h or repeat scan if chemical DVT ppx is warranted  · SBP goal normotensive  · Defer Keppra use  · Avoid NSAIDS/AC/AP for at least 2 weeks  · Avoid heavy lifting and strenuous activities  · DVT ppx: SCDs, repeat CT head if chemical DVT warranted  · No neurosurgical intervention, signing off  Patient may follow up PRN  Call with questions or concerns  Occipital fracture (HCC)  Assessment & Plan  Outside imaging personally reviewed and reviewed with attendings:  · CT head: left occipital nondisplaced skull fracture  · STAT CT head without contrast if decline in GCS >2pts/1h  · Pain control per primary team  · No neurosurgical intervention  Complex laceration of left ear  Assessment & Plan  · Repaired by trauma    Cigarette nicotine dependence without complication  Assessment & Plan  · Nicotine patch           History of Present Illness   HPI: Stacy Amanda is a 26y o  year old male with PMH including Lyme disease, seizures, MRSA who presents with complaint of bleeding from his left ear and headache after a mechanical fall  Patient states he was getting down from his tree stand when it started drizzling  His foot slipped from the metal step causing him to fall on his left side  He denies LOC at that time, he was able to walk back to his house when he was standing on a cemented area and had a brief LOC   He was able to get up on his own after the brief LOC and walk into the house  He was having an left sided headache and bleeding from his left ear  He denies any AC/AP/NSAID use  He went to [facility] for evaluation where a CT scan of his head and neck was completed  The CT demonstrated a nondisplaced left occipital skull fracture and a small anterior falx SDH  He states his headache was constant but improved overnight, he now states it's more intermittent  He states the headache was located over the left side of his head, described as throbbing and rated 8 out of 10 initially, now improved to 4 out of 10  He admits to having vomiting x3 episodes  He denies any photophobia or phonophobia  Review of Systems   Constitutional: Negative for activity change  HENT: Positive for tinnitus  Negative for trouble swallowing  Eyes: Positive for visual disturbance (resolved)  Negative for photophobia  Respiratory: Negative for shortness of breath  Cardiovascular: Negative for chest pain  Gastrointestinal: Positive for nausea and vomiting  Negative for abdominal pain, constipation and diarrhea  Genitourinary: Negative for difficulty urinating, dysuria, frequency and urgency  Musculoskeletal: Negative for arthralgias, back pain and neck pain  Skin: Positive for wound  Negative for rash  Allergic/Immunologic: Positive for environmental allergies  Negative for food allergies  Neurological: Positive for headaches  Negative for dizziness, seizures, weakness and numbness  Psychiatric/Behavioral: Negative for confusion         Historical Information   Past Medical History:   Diagnosis Date    Asthma     Lyme disease     Lyme disease     Personal history of methicillin resistant Staphylococcus aureus     [address] [address] )     childhood     Past Surgical History:   Procedure Laterality Date    ANKLE SURGERY      ORIF ANKLE FRACTURE BIMALLEOLAR       Social History     Substance and Sexual Activity   Alcohol Use No     Social History     Substance and Sexual Activity   Drug Use Yes    Types: Marijuana     Social History     Tobacco Use   Smoking Status Current Every Day Smoker    Packs/day: 1 00    Years: 3 00    Pack years: 3 00   Smokeless Tobacco Never Used     Family History   Problem Relation Age of Onset    Hypertension Mother     Asthma Father     Melanoma Paternal Grandfather     Diabetes Maternal Aunt        Meds/Allergies   all current active meds have been reviewed and current meds:   Current Facility-Administered Medications   Medication Dose Route Frequency    acetaminophen (TYLENOL) tablet 975 mg  975 mg Oral Q8H [facility] & longterm    bacitracin topical ointment 1 small application  1 small application Topical BID    HYDROmorphone (DILAUDID) injection 0 5 mg  0 5 mg Intravenous Q1H PRN    nicotine (NICODERM CQ) 21 mg/24 hr TD 24 hr patch 21 mg  21 mg Transdermal Daily    nicotine polacrilex (NICORETTE) gum 2 mg  2 mg Oral Q2H PRN    ondansetron (ZOFRAN) injection 4 mg  4 mg Intravenous Q4H PRN    oxyCODONE (ROXICODONE) immediate release tablet 10 mg  10 mg Oral Q4H PRN    oxyCODONE (ROXICODONE) IR tablet 5 mg  5 mg Oral Q4H PRN     No Known Allergies    Objective   I/O       09/25 0701 - 09/26 0700 09/26 0701 - 09/27 0700 09/27 0701 - 09/28 0700    P  O   200     Total Intake(mL/kg)  200 (3 6)     Net  +200            Unmeasured Urine Occurrence  1 x           Physical Exam   Constitutional: He is oriented to person, place, and time  He appears well-developed and well-nourished  HENT:   Head: Normocephalic and atraumatic  Left Ear: Left ear exhibits lacerations  Laceration repaired with simple interrupted sutures    Eyes: Pupils are equal, round, and reactive to light  EOM are normal    Neck: No spinous process tenderness present  Cardiovascular: Normal rate  Pulmonary/Chest: Effort normal  No respiratory distress  Abdominal: Soft  There is no tenderness  There is no rebound     Neurological: He is alert and oriented to person, place, and time  He has normal strength  He has a normal Finger-Nose-Finger Test    Reflex Scores:       Bicep reflexes are 1+ on the right side and 1+ on the left side  Brachioradialis reflexes are 1+ on the right side and 1+ on the left side  Patellar reflexes are 2+ on the right side and 2+ on the left side  Achilles reflexes are 2+ on the right side and 2+ on the left side  Skin: Skin is warm  Psychiatric: His speech is normal and behavior is normal  Thought content normal      Neurologic Exam     Mental Status   Oriented to person, place, and time  Registration: recalls 3 of 3 objects  Recall at 5 minutes: recalls 3 of 3 objects  Follows 2 step commands  Attention: normal  Concentration: normal    Speech: speech is normal   Level of consciousness: alert  Knowledge: good  Able to perform simple calculations  Able to name object  Able to repeat  Normal comprehension  Cranial Nerves     CN II   Visual fields full to confrontation  CN III, IV, VI   Pupils are equal, round, and reactive to light  Extraocular motions are normal    Right pupil: Size: 5 mm  Shape: regular  Reactivity: brisk  Left pupil: Size: 5 mm  Shape: regular  Reactivity: brisk  CN III: no CN III palsy  CN VI: no CN VI palsy  Nystagmus: none   Diplopia: none  Ophthalmoparesis: none  Upgaze: normal  Downgaze: normal  Conjugate gaze: present    CN V   Facial sensation intact  CN VII   Facial expression full, symmetric  CN VIII   CN VIII normal      CN XII   Tongue deviation: none    Motor Exam   Muscle bulk: normal  Overall muscle tone: normal  Right arm pronator drift: absent  Left arm pronator drift: absent    Strength   Strength 5/5 throughout       Sensory Exam   Light touch normal    Proprioception normal      Gait, Coordination, and Reflexes     Coordination   Finger to nose coordination: normal    Tremor   Resting tremor: absent    Reflexes   Right brachioradialis: 1+  Left brachioradialis: 1+  Right biceps: 1+  Left biceps: 1+  Right patellar: 2+  Left patellar: 2+  Right achilles: 2+  Left achilles: 2+  Right Pang: absent  Left Pang: absent  Right ankle clonus: absent  Left ankle clonus: absent        Vitals:Blood pressure 118/58, pulse 71, temperature 98 6 °F (37 °C), resp  rate 18, height 5' 6" (1 676 m), weight 55 3 kg (122 lb), SpO2 98 %  ,Body mass index is 19 69 kg/m²  Lab Results:   Results from last 7 days   Lab Units 09/27/19  0516   WBC Thousand/uL 11 05*   HEMOGLOBIN g/dL 13 0   HEMATOCRIT % 39 4   PLATELETS Thousands/uL 216     Results from last 7 days   Lab Units 09/27/19  0516   POTASSIUM mmol/L 3 7   CHLORIDE mmol/L 111*   CO2 mmol/L 24   BUN mg/dL 10   CREATININE mg/dL 0 65   CALCIUM mg/dL 8 4                 No results found for: TROPONINT  ABG:No results found for: PHART, TBM1KON, PO2ART, PDU3EYD, W0YUUPPF, BEART, SOURCE    Imaging Studies: I have personally reviewed pertinent reports  and I have personally reviewed pertinent films in PACS    EKG, Pathology, and Other Studies: I have personally reviewed pertinent reports     and I have personally reviewed pertinent films in PACS    VTE Prophylaxis: Sequential compression device (Venodyne)  and Reason for no pharmacologic prophylaxis small SDH    Code Status: Level 1 - Full Code  Advance Directive and Living Will:      Power of :    POLST:

## 2019-09-27 NOTE — ED NOTES
Pt's mother, [facility], can be contacted at [address]  09/26/19 3224

## 2019-09-27 NOTE — H&P
H&P Exam - Trauma   Nancy Roth 26 y o  male MRN: [id number]  Unit/Bed#: ED 06 Encounter: [id number]    Assessment/Plan   Trauma Alert: Evaluation  Model of Arrival: Ambulance  Trauma Team: Attending [facility] and [facility] [place]  Consultants: Neurosurgery    Trauma Active Problems:   Subdural hematoma  Occipital fracture  Left ear laceration    Trauma Plan:   Admitted step-down 2/hot protocol  Neuro surgery evaluation  Frequent neuro checks  Continue C-collar; aspen collar ordered  Pain control  Laceration repair  PT/OT      Chief Complaint:  Headache    History of Present Illness   HPI:  Nancy Roth is a 26 y o  male who presents as a transfer from [facility]   At approximately 3:30 p m  Patient fell approximately 8 feet from a tree stand while hunting  He remembers all the events of the fall  He struck his head when he landed  He initially walked home approximately 100 yd  He then had an unwitnessed loss of consciousness  He was found on the ground confused by his grandfather  He went to [facility] for evaluation  He was diaphoretic and vomited twice in route  He was found have an occipital fracture and a subdural hematoma  He was transferred here for neurosurgical evaluation  Patient complains of an occipital headache  He has no other complaints  Denies nausea  Denies numbness, tingling, weakness in extremities  Patient is acting at his neurologic baseline per family, who is present  Mechanism:Fall    Review of Systems   Constitutional: Negative for appetite change, chills, diaphoresis, fatigue and fever  HENT: Negative for congestion, rhinorrhea and sore throat  Eyes: Negative for photophobia, pain, discharge, redness, itching and visual disturbance  Respiratory: Negative for apnea, cough, choking, chest tightness, shortness of breath, wheezing and stridor  Cardiovascular: Negative for chest pain, palpitations and leg swelling     Gastrointestinal: Negative for abdominal distention, abdominal pain, constipation, diarrhea, nausea and vomiting  Genitourinary: Negative for dysuria and hematuria  Musculoskeletal: Positive for arthralgias (right shoulder pain)  Negative for back pain, neck pain and neck stiffness  Skin: Negative for pallor, rash and wound  Neurological: Positive for headaches  Negative for dizziness and light-headedness  Psychiatric/Behavioral: Negative for behavioral problems and confusion  All other systems reviewed and are negative  Historical Information   History is unobtainable from the patient due to n/a    Efforts to obtain history included the following sources: family member, other medical personnel, obtained from other records    Past Medical History:   Diagnosis Date    Asthma     Lyme disease     Lyme disease     Personal history of methicillin resistant Staphylococcus aureus     Seizures [address] [address] )     childhood     Past Surgical History:   Procedure Laterality Date    ANKLE SURGERY      ORIF ANKLE FRACTURE BIMALLEOLAR       Social History   Social History     Substance and Sexual Activity   Alcohol Use No     Social History     Substance and Sexual Activity   Drug Use No     Social History     Tobacco Use   Smoking Status Current Every Day Smoker   Smokeless Tobacco Never Used     Immunization History   Administered Date(s) Administered    Tdap 03/11/2019     Last Tetanus: 09/26/2019  Family History: Non-contributory  Unable to obtain/limited by n/a      Meds/Allergies   all current active meds have been reviewed    No Known Allergies      PHYSICAL EXAM    PE limited by: N/A    Objective   Vitals:   First set: Temperature: 98 4 °F (36 9 °C) (09/26/19 2009)  Pulse: 60 (09/26/19 2009)  Respirations: 18 (09/26/19 2009)  Blood Pressure: 120/57 (09/26/19 2009)    Primary Survey:   (A) Airway:  INTACT  (B) Breathing:  Bilateral breath sounds  (C) Circulation: Pulses:   carotid  2/4, pedal  2/4, radial  2/4 and femoral  2/4  (D) Disabliity:  GCS Total:  15  (E) Expose:  Completed    Secondary Survey: (Click on Physical Exam tab above)  Physical Exam   Constitutional: He is oriented to person, place, and time  He appears well-developed and well-nourished  No distress  HENT:   Head: Normocephalic and atraumatic  C-collar in place   Eyes: Pupils are equal, round, and reactive to light  Conjunctivae and EOM are normal  No scleral icterus  Neck: Normal range of motion  Neck supple  Cardiovascular: Normal rate, regular rhythm and normal heart sounds  No murmur heard  Pulmonary/Chest: Effort normal and breath sounds normal  No respiratory distress  He has no wheezes  Abdominal: Soft  Bowel sounds are normal  He exhibits no distension and no mass  There is no tenderness  There is no rebound and no guarding  Musculoskeletal: Normal range of motion  He exhibits no edema, tenderness or deformity  High C-spine tenderness to palpation  No step-off, deformity  C-collar in place   Neurological: He is alert and oriented to person, place, and time  No cranial nerve deficit or sensory deficit  He exhibits normal muscle tone  Skin: Skin is warm and dry  Capillary refill takes less than 2 seconds  No rash noted  He is not diaphoretic  Psychiatric: He has a normal mood and affect  His behavior is normal    Nursing note and vitals reviewed        Invasive Devices     None                 Lab Results: Results: I have personally reviewed pertinent films in PACS, BMP/CMP: No results found for: SODIUM, K, CL, CO2, ANIONGAP, BUN, CREATININE, GLUCOSE, CALCIUM, AST, ALT, ALKPHOS, PROT, BILITOT, EGFR and CBC: No results found for: WBC, HGB, HCT, MCV, PLT, ADJUSTEDWBC, MCH, MCHC, RDW, MPV, NRBC  Imaging/EKG Studies: Results: I have personally reviewed pertinent films in PACS  Other Studies:     Code Status: Level 1 - Full Code  Advance Directive and Living Will:      Power of :    POLST:

## 2019-09-27 NOTE — OCCUPATIONAL THERAPY NOTE
[address] Evaluation     Patient Name: Mary Parker  VRHRR'F Date: 9/27/2019  Problem List  Principal Problem:    Subdural hematoma ([address] )  Active Problems:    Cigarette nicotine dependence without complication    Occipital fracture (HCC)    Complex laceration of left ear    Past Medical History  Past Medical History:   Diagnosis Date    Asthma     Lyme disease     Lyme disease     Personal history of methicillin resistant Staphylococcus aureus     Seizures ([address] )     childhood     Past Surgical History  Past Surgical History:   Procedure Laterality Date    ANKLE SURGERY      ORIF ANKLE FRACTURE BIMALLEOLAR           09/27/19 1035   Note Type   Note type Eval only   Restrictions/Precautions   Weight Bearing Precautions Per Order No   Pain Assessment   Pain Assessment No/denies pain   Home Living   Type of [address] [place] [address] One level;Stairs to enter with rails   Prior Function   Level of [place] Independent with ADLs and functional mobility   Lives With Family   Receives Help From Family   ADL Assistance Independent   IADLs Independent   Falls in the last 6 months 1 to 4   Vocational Full time employment   Lifestyle   Autonomy I adls and mobility - i iadls - shares homemaking with family   Reciprocal Relationships supportive family and friends   Service to Others works FT   Intrinsic Gratification active pta   Subjective   Subjective offers no c/o    ADL   Eating Assistance 7  Independent   Grooming Assistance 7  [address] [facility] [address]  [address] [facility]   Supine to [address] [facility] to Supine 7  Independent   Transfers   Sit to [address] [facility] [address] to Sit 7  Independent   Stand pivot 7  Independent   Functional Mobility   Functional Mobility 7  Independent   Balance   Static Sitting Good Dynamic Sitting Fair +   Static Standing Fair +   Dynamic Standing Fair +   Ambulatory Fair +   Activity Tolerance   Activity Tolerance Patient tolerated treatment well   RUE Assessment   RUE Assessment WFL   LUE Assessment   LUE Assessment WFL   Cognition   Overall Cognitive Status WFL   Arousal/Participation Alert; Cooperative   Attention Within functional limits   Orientation Level Oriented X4   Memory Within functional limits   Following Commands Follows all commands and directions without difficulty   Comments no deficits noted - family confirms pt is at baseline level of cognitive function   Assessment   Limitation Decreased high-level ADLs   Prognosis Good   Assessment Pt is a 26 y o  male who was admitted to [facility] on 9/26/2019 with Subdural hematoma (HCC) s/p fall from tree stand - pt able to get up from ground and walk back to house where he had syncopal episode and +emesis  Pt's problem list also includes PMH of underlying neurological disorder and asthma, lyme disease, MRSA, seizure d/o  At baseline pt was completing adls and mobility independently w/o ad - iadls - shares homemaking with family  Pt lives family in 1 story home with 1 YADIRA  Currently pt requires no physical assist for overall ADLS and  for functional mobility/transfers  Pt currently presents with impairments in the following categories -difficulty performing IADLS  standing balance/tolerance  These impairments, as well as pt's fatigue  limit pt's ability to safely engage in all baseline areas of occupation, includingcommunity mobility, laundry , driving, house maintenance, meal prep, cleaning, work/volunteer work , social participation  and leisure activities however family will provide assist prn for iadls post d/c - reviewed home safety and importance of cognitive rest with gradual return to normal activity- From OT standpoint, recommend home with family support  upon D/C  No further acute OT needs indicated at this time - Recommend continued oob for meals, ambulation to/from BR, setup for self care tasks and mobility in hallway with nursing/restorative - d/c from caseload with above recommendations   Goals   Patient Goals go home    Plan   Treatment Interventions Patient/family training; Compensatory technique education; Activityengagement   OT Frequency Eval only   Recommendation   OT Discharge Recommendation Home with family support   OT - OK to Discharge Yes   Barthel Index   Feeding 10   Bathing 5   Grooming Score 5   Dressing Score 10   Bladder Score 10   Bowels Score 10   Toilet Use Score 10   Transfers (Bed/Chair) Score 15   Mobility (Level Surface) Score 15   Stairs Score 10   Barthel Index Score 100     [place], [place]

## 2019-09-27 NOTE — ED NOTES
Patient currently anxious and wants to smoke  Dr Shira Farnsworth at bedside speaking with patient       Doris Alcantara, KELLEY  09/26/19 8721

## 2019-09-27 NOTE — PROGRESS NOTES
Tertiary / Progress Note Farhad Gamez 1995, 26 y o  male MRN: [id number]    Unit/Bed#: [facility] 628-01 Encounter: [id number]    Primary Care Provider: Jamar Quinteros MD   Date and time admitted to hospital: 9/26/2019  8:04 PM        Complex laceration of left ear  Assessment & Plan  - Sutured in ED  - Local wound care  - Bacitracin    Occipital fracture ([address] )  Assessment & Plan  - neurosurgery consult  - C-collar cleared by nexus this morning at 0600  - pain control  - PT/OT    Cigarette nicotine dependence without complication  Assessment & Plan  Nicotine patch    * Subdural hematoma (HCC)  Assessment & Plan  - neurosurgery consult  - possibly repeat CT head; Will await neurosurgery recommendations          Bedside nurse rounds completed with nurse yes  Prophylaxis: Sequential compression device (Venodyne)  and Reason for no pharmacologic prophylaxis SDH    Disposition: Unchanged    Code status:  Level 1 - Full Code    Consultants:  Neurosurgery    Is the patient 72 years or older?: No    SUBJECTIVE:     Transfer from:  [facility]  Outside Films Received: yes  Tertiary Exam Due on:  09/27/2019    Mechanism of Injury:Fall    Details related to Injury: +LOC:  yes    Chief Complaint:  Headache    HPI/Last 24 hour events:  No acute events overnight  Patient complaining of occipital headache  Headache is controlled with Tylenol  Patient 1 episode of vomiting overnight  He denies numbness, tingling, weakness in his extremities      Active medications:           Current Facility-Administered Medications:     acetaminophen (TYLENOL) tablet 975 mg, 975 mg, Oral, Q8H ARNALDO, 975 mg at 09/27/19 0535    bacitracin topical ointment 1 small application, 1 small application, Topical, BID    HYDROmorphone (DILAUDID) injection 0 5 mg, 0 5 mg, Intravenous, Q1H PRN    nicotine (NICODERM CQ) 21 mg/24 hr TD 24 hr patch 21 mg, 21 mg, Transdermal, Daily, 21 mg at 09/26/19 1413    nicotine polacrilex (NICORETTE) gum 2 mg, 2 mg, Oral, Q2H PRN    ondansetron (ZOFRAN) injection 4 mg, 4 mg, Intravenous, Q4H PRN    oxyCODONE (ROXICODONE) immediate release tablet 10 mg, 10 mg, Oral, Q4H PRN    oxyCODONE (ROXICODONE) IR tablet 5 mg, 5 mg, Oral, Q4H PRN      OBJECTIVE:     Vitals:   Vitals:    09/27/19 0307   BP: 118/60   Pulse: 77   Resp: 18   Temp: 98 8 °F (37 1 °C)   SpO2: 96%       Physical Exam:   GENERAL APPEARANCE:  No acute distress  Patient sitting up in bed  NEURO:  Alert and orient x4  GCS 15  HEENT:  2 small lacerations to left face anterior to tragus and left pinna with sutures in place  No active bleeding  CV:  Regular rate rhythm  No murmur  LUNGS:  CTAB  GI:  Soft, nontender, nondistended  :  Voiding  MSK:  No joint edema, tenderness, deformity  SKIN:  Intact with the exception of lacerations noted above      I/O:   I/O     None          Invasive Devices: Invasive Devices     Peripheral Intravenous Line            Peripheral IV 09/26/19 Left;Upper;Ventral (anterior) Arm 1 day    Peripheral IV 09/26/19 Right;Ventral (anterior) Forearm 1 day                  Imaging:   No results found      Labs:   CBC:   Lab Results   Component Value Date    WBC 11 05 (H) 09/27/2019    HGB 13 0 09/27/2019    HCT 39 4 09/27/2019    MCV 90 09/27/2019     09/27/2019    MCH 29 8 09/27/2019    MCHC 33 0 09/27/2019    RDW 13 0 09/27/2019    MPV 9 5 09/27/2019     CMP:   Lab Results   Component Value Date     (H) 09/27/2019    CO2 24 09/27/2019    BUN 10 09/27/2019    CREATININE 0 65 09/27/2019    CALCIUM 8 4 09/27/2019    EGFR 138 09/27/2019

## 2019-09-27 NOTE — PROCEDURES
Laceration repair  Date/Time: 9/26/2019 10:16 PM  Performed by: Anuradha Nick MD  Authorized by: Anuradha Nick MD   Consent: Verbal consent obtained  Risks and benefits: risks, benefits and alternatives were discussed  Consent given by: patient  Patient understanding: patient states understanding of the procedure being performed  Patient consent: the patient's understanding of the procedure matches consent given  Procedure consent: procedure consent matches procedure scheduled  Patient identity confirmed: verbally with patient and arm band  Body area: head/neck  Location details: left ear  Laceration length: 1 5 cm  Foreign bodies: no foreign bodies  Tendon involvement: none  Nerve involvement: none  Vascular damage: no  Anesthesia: local infiltration    Anesthesia:  Local Anesthetic: lidocaine 1% without epinephrine  Anesthetic total: 3 5 mL      Procedure Details:  Preparation: Patient was prepped and draped in the usual sterile fashion    Irrigation solution: tap water  Irrigation method: syringe  Amount of cleaning: extensive  Debridement: none  Degree of undermining: none  Skin closure: 5-0 Prolene  Number of sutures: 5  Technique: simple  Approximation: close  Approximation difficulty: simple  Dressing: antibiotic ointment  Patient tolerance: Patient tolerated the procedure well with no immediate complications

## 2019-09-27 NOTE — ASSESSMENT & PLAN NOTE
Outside imaging personally reviewed and reviewed with attendings:  · CT head with minimal anterior falx SDH  · STAT CT head without contrast if decline in GCS >2pts/1h or repeat scan if chemical DVT ppx is warranted  · SBP goal normotensive  · Defer Keppra use  · Avoid NSAIDS/AC/AP for at least 2 weeks  · Avoid heavy lifting and strenuous activities  · DVT ppx: SCDs, repeat CT head if chemical DVT warranted  · No neurosurgical intervention, signing off  Patient may follow up PRN  Call with questions or concerns

## 2019-09-27 NOTE — PLAN OF CARE
Problem: NEUROSENSORY - ADULT  Goal: Achieves stable or improved neurological status  Description  INTERVENTIONS  - Monitor and report changes in neurological status  - Monitor vital signs such as temperature, blood pressure, glucose, and any other labs ordered   - Initiate measures to prevent increased intracranial pressure  - Monitor for seizure activity and implement precautions if appropriate      Outcome: Progressing     Problem: PAIN - ADULT  Goal: Verbalizes/displays adequate comfort level or baseline comfort level  Description  Interventions:  - Encourage patient to monitor pain and request assistance  - Assess pain using appropriate pain scale  - Administer analgesics based on type and severity of pain and evaluate response  - Implement non-pharmacological measures as appropriate and evaluate response  - Consider cultural and social influences on pain and pain management  - Notify physician/advanced practitioner if interventions unsuccessful or patient reports new pain  Outcome: Progressing     Problem: INFECTION - ADULT  Goal: Absence or prevention of progression during hospitalization  Description  INTERVENTIONS:  - Assess and monitor for signs and symptoms of infection  - Monitor lab/diagnostic results  - Monitor all insertion sites, i e  indwelling lines, tubes, and drains  - Monitor endotracheal if appropriate and nasal secretions for changes in amount and color  - [place] appropriate cooling/warming therapies per order  - Administer medications as ordered  - Instruct and encourage patient and family to use good hand hygiene technique  - Identify and instruct in appropriate isolation precautions for identified infection/condition  Outcome: Progressing     Problem: SAFETY ADULT  Goal: Patient will remain free of falls  Description  INTERVENTIONS:  - Assess patient frequently for physical needs  -  Identify cognitive and physical deficits and behaviors that affect risk of falls    -  [place] fall precautions as indicated by assessment   - Educate patient/family on patient safety including physical limitations  - Instruct patient to call for assistance with activity based on assessment  - Modify environment to reduce risk of injury  - Consider OT/PT consult to assist with strengthening/mobility  Outcome: Progressing  Goal: Maintain or return to baseline ADL function  Description  INTERVENTIONS:  -  Assess patient's ability to carry out ADLs; assess patient's baseline for ADL function and identify physical deficits which impact ability to perform ADLs (bathing, care of mouth/teeth, toileting, grooming, dressing, etc )  - Assess/evaluate cause of self-care deficits   - Assess range of motion  - Assess patient's mobility; develop plan if impaired  - Assess patient's need for assistive devices and provide as appropriate  - Encourage maximum independence but intervene and supervise when necessary  - Involve family in performance of ADLs  - Assess for home care needs following discharge   - Consider OT consult to assist with ADL evaluation and planning for discharge  - Provide patient education as appropriate  Outcome: Progressing  Goal: Maintain or return mobility status to optimal level  Description  INTERVENTIONS:  - Assess patient's baseline mobility status (ambulation, transfers, stairs, etc )    - Identify cognitive and physical deficits and behaviors that affect mobility  - Identify mobility aids required to assist with transfers and/or ambulation (gait belt, sit-to-stand, lift, walker, cane, etc )  - [place] fall precautions as indicated by assessment  - Record patient progress and toleration of activity level on Mobility SBAR; progress patient to next Phase/Stage  - Instruct patient to call for assistance with activity based on assessment  - Consider rehabilitation consult to assist with strengthening/weightbearing, etc   Outcome: Progressing     Problem: DISCHARGE PLANNING  Goal: Discharge to home or other facility with appropriate resources  Description  INTERVENTIONS:  - Identify barriers to discharge w/patient and caregiver  - Arrange for needed discharge resources and transportation as appropriate  - Identify discharge learning needs (meds, wound care, etc )  - Arrange for interpretive services to assist at discharge as needed  - Refer to Case Management Department for coordinating discharge planning if the patient needs post-hospital services based on physician/advanced practitioner order or complex needs related to functional status, cognitive ability, or social support system  Outcome: Progressing     Problem: Knowledge Deficit  Goal: Patient/family/caregiver demonstrates understanding of disease process, treatment plan, medications, and discharge instructions  Description  Complete learning assessment and assess knowledge base    Interventions:  - Provide teaching at level of understanding  - Provide teaching via preferred learning methods  Outcome: Progressing

## 2019-09-28 ENCOUNTER — TELEPHONE (OUTPATIENT)
Dept: FAMILY MEDICINE CLINIC | Facility: CLINIC | Age: 24
End: 2019-09-28

## 2019-09-28 DIAGNOSIS — S06.5X9A SUBDURAL HEMATOMA (HCC): Primary | ICD-10-CM

## 2019-09-28 DIAGNOSIS — S02.119D CLOSED FRACTURE OF OCCIPITAL BONE WITH ROUTINE HEALING, UNSPECIFIED LATERALITY, UNSPECIFIED OCCIPITAL FRACTURE TYPE, SUBSEQUENT ENCOUNTER: ICD-10-CM

## 2019-09-28 RX ORDER — HYDROCODONE BITARTRATE AND ACETAMINOPHEN 5; 325 MG/1; MG/1
1 TABLET ORAL EVERY 6 HOURS PRN
Qty: 20 TABLET | Refills: 0 | Status: SHIPPED | OUTPATIENT
Start: 2019-09-28 | End: 2019-10-09 | Stop reason: CLARIF

## 2019-09-28 RX ORDER — ONDANSETRON 8 MG/1
8 TABLET, ORALLY DISINTEGRATING ORAL EVERY 8 HOURS PRN
Qty: 20 TABLET | Refills: 0 | Status: SHIPPED | OUTPATIENT
Start: 2019-09-28 | End: 2019-10-09 | Stop reason: CLARIF

## 2019-09-28 NOTE — TELEPHONE ENCOUNTER
Please let the patient know I sent hydrocodone for his pain and Zofran for his nausea and vomiting  If things get worse through the day he should return to the ER

## 2019-10-03 ENCOUNTER — OFFICE VISIT (OUTPATIENT)
Dept: SURGERY | Facility: CLINIC | Age: 24
End: 2019-10-03

## 2019-10-03 DIAGNOSIS — S01.312S: Primary | ICD-10-CM

## 2019-10-03 PROCEDURE — 99024 POSTOP FOLLOW-UP VISIT: CPT

## 2019-10-03 NOTE — PROGRESS NOTES
Subjective     Tyler Ordonez is a 26 y o  male who obtained a laceration on the left ear , which required closure with 5 sutures  Mechanism of injury: fall out of tree  He denies pain, redness, or drainage from the wound  Review of Systems      Objective     There were no vitals taken for this visit  Injury exam:  A  laceration noted on the on the left ear is healing well, without evidence of infection  Assessment/Plan     Laceration is healing well, without evidence of infection  1  5 sutures were removed  2  Wound care discussed  3  Follow up as needed

## 2019-10-04 ENCOUNTER — OFFICE VISIT (OUTPATIENT)
Dept: FAMILY MEDICINE CLINIC | Facility: CLINIC | Age: 24
End: 2019-10-04
Payer: COMMERCIAL

## 2019-10-04 ENCOUNTER — DOCUMENTATION (OUTPATIENT)
Dept: FAMILY MEDICINE CLINIC | Facility: CLINIC | Age: 24
End: 2019-10-04

## 2019-10-04 VITALS
HEIGHT: 66 IN | BODY MASS INDEX: 19.53 KG/M2 | WEIGHT: 121.5 LBS | DIASTOLIC BLOOD PRESSURE: 76 MMHG | SYSTOLIC BLOOD PRESSURE: 112 MMHG | TEMPERATURE: 98.5 F | OXYGEN SATURATION: 98 % | HEART RATE: 63 BPM

## 2019-10-04 DIAGNOSIS — S01.312D COMPLEX LACERATION OF LEFT EAR, SUBSEQUENT ENCOUNTER: ICD-10-CM

## 2019-10-04 DIAGNOSIS — S02.119D CLOSED FRACTURE OF OCCIPITAL BONE WITH ROUTINE HEALING, UNSPECIFIED LATERALITY, UNSPECIFIED OCCIPITAL FRACTURE TYPE, SUBSEQUENT ENCOUNTER: ICD-10-CM

## 2019-10-04 DIAGNOSIS — Z23 NEED FOR INFLUENZA VACCINATION: ICD-10-CM

## 2019-10-04 DIAGNOSIS — S06.5X9A SUBDURAL HEMATOMA (HCC): Primary | ICD-10-CM

## 2019-10-04 PROBLEM — S01.312A COMPLEX LACERATION OF LEFT EAR: Status: RESOLVED | Noted: 2019-09-27 | Resolved: 2019-10-04

## 2019-10-04 PROCEDURE — 99495 TRANSJ CARE MGMT MOD F2F 14D: CPT | Performed by: FAMILY MEDICINE

## 2019-10-04 NOTE — ASSESSMENT & PLAN NOTE
For the subdural hematoma occipital fracture I would like to have imaging for follow-up prior to releasing him back to work and driving  He is currently not doing either  I am surprised the Trauma surgery did not see him in follow-up yesterday but given that he is doing so well I think if his CT is improved I can release him to return to work  If there is any question on the CT I am going to have to refer back to Trauma surgery as to the next steps  We will get the CT preauthorized and hopefully can have it early next week  In the meantime he will continue to follow all of the same precautions including not driving, not working, not lifting anything heavy

## 2019-10-04 NOTE — LETTER
October 4, 2019     Patient: Chloe Bustillo   YOB: 1995   Date of Visit: 10/4/2019       To Whom it May Concern: Chloe Bustillo is under my professional care  He was seen in my office on 10/4/2019  Due to an injury he has been out of work since 9/26/2019  At this point he is not yet cleared to return to work  I am hoping that early next week after some follow-up studies he will be cleared  Please get me any paperwork that needs to be completed if necessary  If you have any questions or concerns, please don't hesitate to call           Sincerely,          Bhargavi Monk MD        CC: No Recipients

## 2019-10-04 NOTE — PROGRESS NOTES
Yamileth Barron at the pre encounter center will follow up on the [employer] referral and take it over from here

## 2019-10-04 NOTE — PROGRESS NOTES
Subjective:   Chief Complaint   Patient presents with    Transition of Care Management     pt here today for his TCM  Pt was in [facility] for his head trauma  Pt was released on 9/27/19  Pt got stitches out yesterday  There were 5 or 6 of them  Pt defers flu shot today  Annual exam due  Patient ID: Mai Min is a 26 y o  male  TCM Call (since 9/3/2019)     Date and time call was made  9/30/2019 10:54 AM    Hospital care reviewed  Records reviewed    Patient was hospitial[facility] at  [facility]    Date of Admission  09/26/19    Date of discharge  09/27/19    Diagnosis  Trauma-subdural hematoma    Disposition  Home    Were the patients medications reviewed and updated  Yes    Current Symptoms  Vomitting; Neausea <img src='C:FILES (X86)      TCM Call (since 9/3/2019)     Post hospital issues  None    Should patient be enrolled in anticoag monitoring? No    Scheduled for follow up? Yes    Patients specialists  Other (comment)    Other specialists names  Jensen at [address] [place] [address] Drive to get stitches removed and to see him for a recheck    Did you obtain your prescribed medications  Yes    Do you need help managing your prescriptions or medications  No    Is transportation to your appointment needed  No    I have advised the patient to call PCP with any new or worsening symptoms  [address]; Parents    Support System  Family    The type of support provided  Physical; Emotional; Financial    Do you have social support  Yes, as much as I need    Are you recieving any outpatient services  No    Are you recieving home care services  No    Are you using any community resources  No    Current waiver services  No    Have you fallen in the last 12 months  Yes    How many times  1    Interperter language line needed  No    Comments  Initial attempt lmtcb 9/27/19 LL  Second attempt spoke with pt 9/28/19 LL             The pt is here to f/u after admission at [facility] 9/26/19 to 9/27/19  He fell off of a hunting stand on to the back of his head on 9/26/2019  He was alone  He did not lose consciousness initially  He was able to walk about 100 feet to his house and remembers going to unlock the door and then not much after that for a little while  He had passed out on his porch  His grandfather found him confused  Nobody witnessed the loss of consciousness  It is unknown if he had a seizure not but based on the hospital notes it does not seem like they thought he did  He was taken to [facility] by family  He vomited a couple of times on the way there  They transferred him to [facility] for further evaluation after CT of the head showed an occipital fracture and subdural hematoma  He also had an ear laceration that was repaired at [facility], stitches were removed and the general surgery office yesterday  He was admitted overnight for observation and did very well, really did not want to stay in the hospital and convince them to discharge him the next day  Initially at home he was having headaches  Now he only complains of a little bit of pain once in a while  No seizures  No n/v  He says he feels basically back to his normal self other than this occasional mild pain in the back of his head    He did have follow-up yesterday with Trauma surgery  After the stitches removed he was advised to sit in the waiting room to be called back for his appointment  After a while they called them and said they were going to cancel that appointment just to follow up with his PCP      The following portions of the patient's history were reviewed and updated as appropriate: allergies, current medications, past family history, past medical history, past social history, past surgical history and problem list     Review of Systems      Objective:  Vitals:    10/04/19 1123   BP: 112/76   Pulse: 63   Temp: 98 5 °F (36 9 °C)   SpO2: 98%   Weight: 55 1 kg (121 lb 8 oz)   Height: 5' 6" (1 676 m)      Physical Exam   Constitutional: He is oriented to person, place, and time  He appears well-developed and well-nourished  No distress  HENT:   Head: Normocephalic and atraumatic  Right Ear: External ear normal    Left Ear: External ear normal    Eyes: Pupils are equal, round, and reactive to light  Conjunctivae and EOM are normal    Neck: Normal range of motion  Neck supple  Pulmonary/Chest: Effort normal  No respiratory distress  Musculoskeletal: Normal range of motion  He exhibits no tenderness  Lymphadenopathy:     He has cervical adenopathy  Neurological: He is alert and oriented to person, place, and time  He has normal strength  He is not disoriented  No cranial nerve deficit or sensory deficit  Coordination and gait normal    Skin: Skin is warm and dry  No rash noted  He is not diaphoretic  No erythema  Psychiatric: He has a normal mood and affect  His behavior is normal  Judgment and thought content normal          Assessment/Plan:    Subdural hematoma (HCC)  For the subdural hematoma occipital fracture I would like to have imaging for follow-up prior to releasing him back to work and driving  He is currently not doing either  I am surprised the Trauma surgery did not see him in follow-up yesterday but given that he is doing so well I think if his CT is improved I can release him to return to work  If there is any question on the CT I am going to have to refer back to Trauma surgery as to the next steps  We will get the CT preauthorized and hopefully can have it early next week  In the meantime he will continue to follow all of the same precautions including not driving, not working, not lifting anything heavy  Complex laceration of left ear  Completely healed and sutures removed         Diagnoses and all orders for this visit:    Subdural hematoma ([address] )  -     CT head wo contrast; Future    Closed fracture of occipital bone with routine healing, unspecified laterality, unspecified occipital fracture type, subsequent encounter  -     CT head wo contrast; Future    Complex laceration of left ear, subsequent encounter    Need for influenza vaccination  -     influenza vaccine, 9912-1607, quadrivalent, recombinant, PF, 0 5 mL, for patients 18 yr+ (FLUBLOK)

## 2019-10-08 ENCOUNTER — HOSPITAL ENCOUNTER (OUTPATIENT)
Dept: CT IMAGING | Facility: HOSPITAL | Age: 24
Discharge: HOME | End: 2019-10-08
Attending: FAMILY MEDICINE
Payer: COMMERCIAL

## 2019-10-08 ENCOUNTER — TELEPHONE (OUTPATIENT)
Dept: FAMILY MEDICINE CLINIC | Facility: CLINIC | Age: 24
End: 2019-10-08

## 2019-10-08 DIAGNOSIS — S06.5X9A SUBDURAL HEMATOMA (HCC): ICD-10-CM

## 2019-10-08 DIAGNOSIS — S02.119D CLOSED FRACTURE OF OCCIPITAL BONE WITH ROUTINE HEALING, UNSPECIFIED LATERALITY, UNSPECIFIED OCCIPITAL FRACTURE TYPE, SUBSEQUENT ENCOUNTER: ICD-10-CM

## 2019-10-08 PROCEDURE — 70450 CT HEAD/BRAIN W/O DYE: CPT

## 2019-10-08 NOTE — TELEPHONE ENCOUNTER
CT scan of brain without contrast is approved  Approval [id number]  Through January 5, 2020  Patient notified

## 2019-10-09 ENCOUNTER — OFFICE VISIT (OUTPATIENT)
Dept: FAMILY MEDICINE CLINIC | Facility: CLINIC | Age: 24
End: 2019-10-09
Payer: COMMERCIAL

## 2019-10-09 VITALS
HEIGHT: 66 IN | SYSTOLIC BLOOD PRESSURE: 124 MMHG | DIASTOLIC BLOOD PRESSURE: 64 MMHG | BODY MASS INDEX: 19.93 KG/M2 | WEIGHT: 124 LBS | OXYGEN SATURATION: 99 % | TEMPERATURE: 98.8 F | HEART RATE: 67 BPM

## 2019-10-09 DIAGNOSIS — F17.210 CIGARETTE NICOTINE DEPENDENCE WITHOUT COMPLICATION: ICD-10-CM

## 2019-10-09 DIAGNOSIS — Z00.00 ANNUAL PHYSICAL EXAM: Primary | ICD-10-CM

## 2019-10-09 DIAGNOSIS — S06.5X9A SUBDURAL HEMATOMA (HCC): ICD-10-CM

## 2019-10-09 DIAGNOSIS — S02.119D CLOSED FRACTURE OF OCCIPITAL BONE WITH ROUTINE HEALING, UNSPECIFIED LATERALITY, UNSPECIFIED OCCIPITAL FRACTURE TYPE, SUBSEQUENT ENCOUNTER: ICD-10-CM

## 2019-10-09 PROBLEM — Z87.898 HISTORY OF SEIZURES: Status: RESOLVED | Noted: 2018-06-21 | Resolved: 2019-10-09

## 2019-10-09 PROBLEM — S06.5XAA SUBDURAL HEMATOMA (HCC): Status: RESOLVED | Noted: 2019-09-27 | Resolved: 2019-10-09

## 2019-10-09 PROCEDURE — 99395 PREV VISIT EST AGE 18-39: CPT | Performed by: FAMILY MEDICINE

## 2019-10-09 NOTE — PROGRESS NOTES
[facility]    NAME: Tyler Ordonez  AGE: 26 y o  SEX: male  : 1995     DATE: 10/9/2019     Assessment and Plan:     Problem List Items Addressed This Visit        Nervous and Auditory    RESOLVED: Subdural hematoma (HCC)       Musculoskeletal and Integument    Occipital fracture ([address] )     The patient had a repeat CT since his last visit with me  There is no further subdural hemorrhage and I did release him to return to work and driving  The fracture is still present, as expected  This will take time to heal   He is completely asymptomatic  Other    Cigarette nicotine dependence without complication      Other Visit Diagnoses     Annual physical exam    -  Primary          Immunizations and preventive care screenings were discussed with patient today  Appropriate education was printed on patient's after visit summary  Counseling:  Dental Health: discussed importance of regular tooth brushing, flossing, and dental visits  · Exercise: the importance of regular exercise/physical activity was discussed  Recommend exercise 3-5 times per week for at least 30 minutes  Tobacco Cessation Counseling: Tobacco cessation counseling was provided  The patient is sincerely urged to quit consumption of tobacco  He is not ready to quit tobacco  Medication options and side effects of medication discussed  Patient refused medication  Return in 1 year (on 10/9/2020)  Chief Complaint:     Chief Complaint   Patient presents with    Annual Exam     patient given head ct results  Would like to return to work beginning of next week  Feeling better  History of Present Illness:     Adult Annual Physical   Patient here for a comprehensive physical exam  The patient reports no problems  Diet and Physical Activity  · Diet/Nutrition: well balanced diet  · Exercise: no formal exercise        Depression Screening  PHQ-9 Depression Screening    PHQ-9:    Frequency of the following problems over the past two weeks:            General Health  · Sleep: sleeps well  · Hearing: normal - bilateral   · Vision: no vision problems  · Dental: regular dental visits and brushes teeth twice daily  Review of Systems:     Review of Systems   Constitutional: Negative for chills, fever and unexpected weight change  HENT: Negative for congestion, ear pain, hearing loss, postnasal drip, rhinorrhea and sore throat  Eyes: Negative for visual disturbance  Respiratory: Negative for cough and shortness of breath  Cardiovascular: Negative for chest pain  Gastrointestinal: Negative for abdominal pain, constipation and diarrhea  Genitourinary: Negative for difficulty urinating  Musculoskeletal: Negative for arthralgias and gait problem  Skin: Negative for rash  Neurological: Negative for light-headedness and headaches  Psychiatric/Behavioral: Negative for dysphoric mood and sleep disturbance  The patient is not nervous/anxious         Past Medical History:     Past Medical History:   Diagnosis Date    Asthma     Lyme disease     Lyme disease     Personal history of methicillin resistant Staphylococcus aureus     Seizures (HCC)     childhood      Past Surgical History:     Past Surgical History:   Procedure Laterality Date    ANKLE SURGERY      ORIF ANKLE FRACTURE BIMALLEOLAR        Social History:     Social History     Socioeconomic History    Marital status: Single     Spouse name: Not on file    Number of children: Not on file    Years of education: Not on file    Highest education level: Not on file   Occupational History    Not on file   Social Needs    Financial resource strain: Not on file    Food insecurity:     Worry: Not on file     Inability: Not on file    Transportation needs:     Medical: Not on file     Non-medical: Not on file   Tobacco Use    Smoking status: Current Every Day Smoker Packs/day: 1 00     Years: 3 00     Pack years: 3 00    Smokeless tobacco: Never Used   Substance and Sexual Activity    Alcohol use: No    Drug use: Yes     Types: Marijuana    Sexual activity: Not on file   Lifestyle    Physical activity:     Days per week: Not on file     Minutes per session: Not on file    Stress: Not on file   Relationships    Social connections:     Talks on phone: Not on file     Gets together: Not on file     Attends Advent service: Not on file     Active member of club or organization: Not on file     Attends meetings of clubs or organizations: Not on file     Relationship status: Not on file    Intimate partner violence:     Fear of current or ex partner: Not on file     Emotionally abused: Not on file     Physically abused: Not on file     Forced sexual activity: Not on file   Other Topics Concern    Not on file   Social History Narrative    Not on file      Family History:     Family History   Problem Relation Age of Onset    Hypertension Mother     Asthma Father     Melanoma Paternal Grandfather     Diabetes Maternal Aunt       Current Medications:     No current outpatient medications on file  No current facility-administered medications for this visit  Allergies:     No Known Allergies   Physical Exam:     /64   Pulse 67   Temp 98 8 °F (37 1 °C)   Ht 5' 6" (1 676 m)   Wt 56 2 kg (124 lb)   SpO2 99%   BMI 20 01 kg/m²     Physical Exam   Constitutional: He is oriented to person, place, and time  He appears well-developed and well-nourished  HENT:   Head: Normocephalic and atraumatic  Right Ear: External ear normal    Left Ear: External ear normal    Nose: Nose normal    Mouth/Throat: Oropharynx is clear and moist    Eyes: Pupils are equal, round, and reactive to light  Conjunctivae and EOM are normal    Neck: Normal range of motion  Neck supple  No thyroid mass and no thyromegaly present     Cardiovascular: Normal rate, regular rhythm and normal heart sounds  Pulmonary/Chest: Effort normal and breath sounds normal    Abdominal: Soft  Normal appearance  There is no tenderness  Musculoskeletal: Normal range of motion  He exhibits no edema  Lymphadenopathy:     He has no cervical adenopathy  Right: No supraclavicular adenopathy present  Neurological: He is alert and oriented to person, place, and time  Skin: Skin is warm, dry and intact  Psychiatric: He has a normal mood and affect  His behavior is normal  Judgment and thought content normal    Nursing note and vitals reviewed        MD Vasyl Mahajan

## 2019-10-09 NOTE — LETTER
October 9, 2019     Patient: Raquel Santamaria   YOB: 1995   Date of Visit: 10/9/2019       To Whom it May Concern: Raquel Santamaria is under my professional care  He was seen in my office on 10/9/2019  He may return to work on 10/14/2019 without restrictions  If you have any questions or concerns, please don't hesitate to call           Sincerely,          Gearldean Spatz, MD        CC: No Recipients

## 2019-10-09 NOTE — ASSESSMENT & PLAN NOTE
The patient had a repeat CT since his last visit with me  There is no further subdural hemorrhage and I did release him to return to work and driving  The fracture is still present, as expected  This will take time to heal   He is completely asymptomatic

## 2019-10-09 NOTE — PATIENT INSTRUCTIONS

## 2020-02-06 ENCOUNTER — OFFICE VISIT (OUTPATIENT)
Dept: URGENT CARE | Facility: CLINIC | Age: 25
End: 2020-02-06
Payer: COMMERCIAL

## 2020-02-06 ENCOUNTER — APPOINTMENT (OUTPATIENT)
Dept: RADIOLOGY | Facility: CLINIC | Age: 25
End: 2020-02-06
Payer: COMMERCIAL

## 2020-02-06 VITALS
OXYGEN SATURATION: 97 % | RESPIRATION RATE: 16 BRPM | WEIGHT: 127 LBS | HEIGHT: 66 IN | BODY MASS INDEX: 20.41 KG/M2 | TEMPERATURE: 98.7 F | HEART RATE: 73 BPM | DIASTOLIC BLOOD PRESSURE: 68 MMHG | SYSTOLIC BLOOD PRESSURE: 116 MMHG

## 2020-02-06 DIAGNOSIS — R07.81 RIB PAIN: ICD-10-CM

## 2020-02-06 DIAGNOSIS — R07.81 RIB PAIN: Primary | ICD-10-CM

## 2020-02-06 PROCEDURE — G0382 LEV 3 HOSP TYPE B ED VISIT: HCPCS | Performed by: PHYSICIAN ASSISTANT

## 2020-02-06 PROCEDURE — 71101 X-RAY EXAM UNILAT RIBS/CHEST: CPT
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
## 2020-02-25 ENCOUNTER — OFFICE VISIT (OUTPATIENT)
Dept: FAMILY MEDICINE CLINIC | Facility: CLINIC | Age: 25
End: 2020-02-25
Payer: COMMERCIAL

## 2020-02-25 VITALS
WEIGHT: 123.5 LBS | OXYGEN SATURATION: 99 % | HEIGHT: 66 IN | TEMPERATURE: 97.8 F | BODY MASS INDEX: 19.85 KG/M2 | SYSTOLIC BLOOD PRESSURE: 102 MMHG | HEART RATE: 62 BPM | DIASTOLIC BLOOD PRESSURE: 68 MMHG

## 2020-02-25 DIAGNOSIS — B35.9 DERMATOPHYTOSES: Primary | ICD-10-CM

## 2020-02-25 PROCEDURE — 99213 OFFICE O/P EST LOW 20 MIN: CPT | Performed by: FAMILY MEDICINE

## 2020-02-25 PROCEDURE — 4004F PT TOBACCO SCREEN RCVD TLK: CPT | Performed by: FAMILY MEDICINE

## 2020-02-25 PROCEDURE — 3008F BODY MASS INDEX DOCD: CPT | Performed by: FAMILY MEDICINE

## 2020-02-25 RX ORDER — KETOCONAZOLE 20 MG/G
CREAM TOPICAL DAILY
Qty: 60 G | Refills: 0 | Status: SHIPPED | OUTPATIENT
Start: 2020-02-25 | End: 2020-03-16 | Stop reason: ALTCHOICE

## 2020-02-25 RX ORDER — TERBINAFINE HYDROCHLORIDE 250 MG/1
250 TABLET ORAL DAILY
Qty: 30 TABLET | Refills: 0 | Status: SHIPPED | OUTPATIENT
Start: 2020-02-25 | End: 2020-03-16 | Stop reason: ALTCHOICE

## 2020-02-25 NOTE — PATIENT INSTRUCTIONS
Athlete's Foot   WHAT YOU NEED TO KNOW:   Athlete's foot is a foot infection caused by a fungus  DISCHARGE INSTRUCTIONS:   Return to the emergency department if:   · You have a fever or chills  · You have red streaks going up your leg  Contact your healthcare provider if:   · Your infection spreads to other parts of your body  · Your infection is not better in 14 days or is not completely gone in 90 days  · The skin on your foot or leg is red and hot  · You have questions or concerns about your condition or care  Medicines:   · Antifungal medicine  may be given as a cream or pill  You may need a doctor's order for this medicine  Take the medicine until it is gone, even if your feet look like they are healed  · Take your medicine as directed  Contact your healthcare provider if you think your medicine is not helping or if you have side effects  Tell him or her if you are allergic to any medicine  Keep a list of the medicines, vitamins, and herbs you take  Include the amounts, and when and why you take them  Bring the list or the pill bottles to follow-up visits  Carry your medicine list with you in case of an emergency  Follow up with your healthcare provider as directed:  Write down your questions so you remember to ask them during your visits  Prevent the spread of athlete's foot:   · Prevent the spread of this infection to other parts of your body  When you shower, dry your groin area and other parts of your body before you dry your feet  · Keep your feet clean and dry  Wash your feet each day and dry them well, especially between your toes  After your feet are dry, put powder on your feet and between your toes  Wear clean cotton or wool socks each day  Put your socks on first so you do not spread the infection to other areas of your body  Wear sandals, canvas tennis shoes, or other shoes that allow air to flow to your feet  This helps keep your feet dry   Do not use shoes that are tight, or made of plastic or rubber  · Soak your feet in an astringent (drying) solution as directed  if you have blisters  You may need to do this for 20 to 30 minutes, 2 times each day to help dry out the blisters  · Wear shoes in public areas  Wear shower shoes or sandals in warm, damp areas  This includes shower stalls, near swimming pools, and locker rooms  Do not share socks or shoes  Do not use public swimming pools  © 2017 [facility] Information is for End User's use only and may not be sold, redistributed or otherwise used for commercial purposes  All illustrations and images included in CareNotes® are the copyrighted property of [employer] , [employer]  or Silver[employer] Heromsillo  The above information is an  only  It is not intended as medical advice for individual conditions or treatments  Talk to your doctor, nurse or pharmacist before following any medical regimen to see if it is safe and effective for you

## 2020-02-25 NOTE — PROGRESS NOTES
Subjective:   Chief Complaint   Patient presents with    Recurrent Skin Infections     PT COMES IN TODAY WITH B/L ATHLETE FOOT SX'S  PT STATES HE HAD THIS MANY YEARS AGO WHEN HE WAS YOUNGER  Patient ID: Nina Miller is a 22 y o  male  The patient is here today because he has a red painful/itchy rash on both feet  It is really bad when his feet are in socks and shoes  It is really bad when he 1st steps into the warm shower  He has had this before in the past  He started using an over-the-counter athlete's foot cream which has made no difference at all  It feels best when it is open to the air      The following portions of the patient's history were reviewed and updated as appropriate: allergies, current medications, past family history, past medical history, past social history, past surgical history and problem list     Review of Systems          Objective:  Vitals:    02/25/20 1000   BP: 102/68   Pulse: 62   Temp: 97 8 °F (36 6 °C)   SpO2: 99%   Weight: 56 kg (123 lb 8 oz)   Height: 5' 6" (1 676 m)      Physical Exam   Constitutional: He is oriented to person, place, and time  He appears well-developed and well-nourished  No distress  Neurological: He is alert and oriented to person, place, and time  Skin: Skin is warm and dry  He is not diaphoretic  Both feet on the medial and lateral aspects as well as the toes and in between the toes are deeply erythematous with some scale         Assessment/Plan:    No problem-specific Assessment & Plan notes found for this encounter  Diagnoses and all orders for this visit:    Dermatophytoses  -     ketoconazole (NIZORAL) 2 % cream; Apply topically daily  -     terbinafine (LamISIL) 250 mg tablet; Take 1 tablet (250 mg total) by mouth daily        If this is indeed fungal it is a really bad fungal infection    I am going to start the patient on terbinafine and ketoconazole cream   If he is not feeling better within a week or so I advised he will let me know at which point I would think maybe this is not fungal

## 2020-03-16 ENCOUNTER — OFFICE VISIT (OUTPATIENT)
Dept: FAMILY MEDICINE CLINIC | Facility: CLINIC | Age: 25
End: 2020-03-16
Payer: COMMERCIAL

## 2020-03-16 VITALS
BODY MASS INDEX: 19.93 KG/M2 | OXYGEN SATURATION: 98 % | WEIGHT: 124 LBS | RESPIRATION RATE: 16 BRPM | DIASTOLIC BLOOD PRESSURE: 74 MMHG | HEIGHT: 66 IN | SYSTOLIC BLOOD PRESSURE: 106 MMHG | TEMPERATURE: 97.9 F | HEART RATE: 61 BPM

## 2020-03-16 DIAGNOSIS — L03.115 CELLULITIS OF RIGHT LOWER EXTREMITY: Primary | ICD-10-CM

## 2020-03-16 PROCEDURE — 4004F PT TOBACCO SCREEN RCVD TLK: CPT | Performed by: FAMILY MEDICINE

## 2020-03-16 PROCEDURE — 99213 OFFICE O/P EST LOW 20 MIN: CPT | Performed by: FAMILY MEDICINE

## 2020-03-16 PROCEDURE — 3008F BODY MASS INDEX DOCD: CPT | Performed by: FAMILY MEDICINE

## 2020-03-16 RX ORDER — AMOXICILLIN AND CLAVULANATE POTASSIUM 875; 125 MG/1; MG/1
1 TABLET, FILM COATED ORAL EVERY 12 HOURS SCHEDULED
Qty: 20 TABLET | Refills: 0 | Status: SHIPPED | OUTPATIENT
Start: 2020-03-16 | End: 2020-03-26

## 2020-03-16 NOTE — PROGRESS NOTES
Assessment/Plan:      Diagnoses and all orders for this visit:    Cellulitis of right lower extremity  Comments:  bilateral feet, no change with antifungal, start augmentin 1 tab twice a day ,, if no change, will need dermatology evaluation  Orders:  -     amoxicillin-clavulanate (AUGMENTIN) 875-125 mg per tablet; Take 1 tablet by mouth every 12 (twelve) hours for 10 days          Subjective:  Chief Complaint   Patient presents with    Foot Pain     About 2 weeks ago patient has had burning sensation in bottom of feet  Predominently seems redder in center of foot  Tops of feet are red, does not itch, no cracking  Denies any changes in lotions, soaps or laundry        Patient ID: Nancy Roth is a 22 y o  male  Complains of rash on both feet, red, not itchy, worse when getting out of shower or taking shoes and socks off  No fevers  Pt seen in the office and given oral and topical antifungal without much relief  No fever  Review of Systems   Constitutional: Negative  Negative for fatigue and fever  HENT: Negative  Eyes: Negative  Respiratory: Negative  Negative for cough  Cardiovascular: Negative  Gastrointestinal: Negative  Endocrine: Negative  Genitourinary: Negative  Musculoskeletal: Negative  Skin: Positive for rash  Allergic/Immunologic: Negative  Neurological: Negative  Psychiatric/Behavioral: Negative  The following portions of the patient's history were reviewed and updated as appropriate: allergies, current medications, past family history, past medical history, past social history, past surgical history and problem list     Objective:  Vitals:    03/16/20 1430   BP: 106/74   Pulse: 61   Resp: 16   Temp: 97 9 °F (36 6 °C)   SpO2: 98%   Weight: 56 2 kg (124 lb)   Height: 5' 6" (1 676 m)      Physical Exam   Constitutional: He is oriented to person, place, and time  He appears well-developed and well-nourished     HENT:   Head: Normocephalic and atraumatic  Cardiovascular: Normal rate, regular rhythm, normal heart sounds and intact distal pulses  Pulmonary/Chest: Effort normal and breath sounds normal    Abdominal: Soft  Bowel sounds are normal    Neurological: He is alert and oriented to person, place, and time  Skin: Skin is warm and dry  Rash noted  There is erythema  Erythema over toes and arch of foot, bilateral    Psychiatric: He has a normal mood and affect  His behavior is normal  Judgment and thought content normal    Nursing note and vitals reviewed

## 2020-04-28 ENCOUNTER — HOSPITAL ENCOUNTER (EMERGENCY)
Facility: HOSPITAL | Age: 25
Discharge: HOME | End: 2020-04-28
Attending: EMERGENCY MEDICINE | Admitting: EMERGENCY MEDICINE
Payer: COMMERCIAL

## 2020-04-28 VITALS
RESPIRATION RATE: 16 BRPM | BODY MASS INDEX: 19.91 KG/M2 | TEMPERATURE: 97.9 F | DIASTOLIC BLOOD PRESSURE: 68 MMHG | HEIGHT: 66 IN | OXYGEN SATURATION: 97 % | WEIGHT: 123.9 LBS | SYSTOLIC BLOOD PRESSURE: 131 MMHG | HEART RATE: 78 BPM

## 2020-04-28 DIAGNOSIS — R55 VASOVAGAL SYNCOPE: ICD-10-CM

## 2020-04-28 DIAGNOSIS — T14.8XXA AVULSION OF SKIN: Primary | ICD-10-CM

## 2020-04-28 DIAGNOSIS — S00.81XA ABRASION OF FACE: ICD-10-CM

## 2020-04-28 LAB
ATRIAL RATE: 68 BPM
P AXIS: 78 DEGREES
PR INTERVAL: 144 MS
QRS AXIS: 108 DEGREES
QRSD INTERVAL: 94 MS
QT INTERVAL: 376 MS
QTC INTERVAL: 399 MS
T WAVE AXIS: 66 DEGREES
VENTRICULAR RATE: 68 BPM

## 2020-04-28 PROCEDURE — 99283 EMERGENCY DEPT VISIT LOW MDM: CPT

## 2020-04-28 PROCEDURE — 93005 ELECTROCARDIOGRAM TRACING: CPT

## 2020-04-28 PROCEDURE — 99284 EMERGENCY DEPT VISIT MOD MDM: CPT | Performed by: PHYSICIAN ASSISTANT

## 2020-04-28 PROCEDURE — 93010 ELECTROCARDIOGRAM REPORT: CPT | Performed by: INTERNAL MEDICINE

## 2020-04-29 ENCOUNTER — VBI (OUTPATIENT)
Dept: FAMILY MEDICINE CLINIC | Facility: CLINIC | Age: 25
End: 2020-04-29

## 2020-07-17 ENCOUNTER — OFFICE VISIT (OUTPATIENT)
Dept: FAMILY MEDICINE CLINIC | Facility: CLINIC | Age: 25
End: 2020-07-17
Payer: COMMERCIAL

## 2020-07-17 VITALS
HEIGHT: 66 IN | HEART RATE: 68 BPM | DIASTOLIC BLOOD PRESSURE: 68 MMHG | OXYGEN SATURATION: 98 % | BODY MASS INDEX: 19.13 KG/M2 | TEMPERATURE: 98.1 F | SYSTOLIC BLOOD PRESSURE: 118 MMHG | WEIGHT: 119 LBS

## 2020-07-17 DIAGNOSIS — R55 VASOVAGAL SYNCOPE: ICD-10-CM

## 2020-07-17 DIAGNOSIS — R68.89 SUSPECTED LYME DISEASE: Primary | ICD-10-CM

## 2020-07-17 PROCEDURE — 4004F PT TOBACCO SCREEN RCVD TLK: CPT | Performed by: FAMILY MEDICINE

## 2020-07-17 PROCEDURE — 3008F BODY MASS INDEX DOCD: CPT | Performed by: FAMILY MEDICINE

## 2020-07-17 PROCEDURE — 99214 OFFICE O/P EST MOD 30 MIN: CPT | Performed by: FAMILY MEDICINE

## 2020-07-17 RX ORDER — DOXYCYCLINE HYCLATE 100 MG
100 TABLET ORAL 2 TIMES DAILY
Qty: 28 TABLET | Refills: 0 | Status: SHIPPED | OUTPATIENT
Start: 2020-07-17 | End: 2020-07-31
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
## 2020-07-17 NOTE — PATIENT INSTRUCTIONS
Lyme Disease   AMBULATORY CARE:   Lyme disease  is a bacterial infection caused by the bite of an infected tick  Common symptoms include the following:   · A red rash that looks like a target or bull's eye    · Fever, chills, or sore throat    · Weakness and tiredness    · Headache or muscle aches    · Joint pain    · Abdominal pain, nausea, or diarrhea  Call 911 for any of the following:   · Your heart is beating faster than usual and you feel dizzy  · You have chest pain or trouble breathing  · You suddenly cannot talk or see well, or you have trouble moving an area of your body  Seek care immediately if:   · You have a headache and a stiff neck  · You have trouble concentrating or thinking clearly  · You have numbness or tingling in your arms or legs, or you have trouble walking  Contact your healthcare provider if:   · Your rash grows or spreads to other areas of your body  · You suddenly have trouble falling or staying asleep  · You have new or worsening pain and swelling in your joints  · You have new or worsening weakness and muscle pain  · You have a new tick bite  · You have questions or concerns about your condition or care  Treatment for Lyme disease  may include any of the following:  · Antibiotics  treat a bacterial infection  · NSAIDs , such as ibuprofen, help decrease swelling, pain, and fever  This medicine is available with or without a doctor's order  NSAIDs can cause stomach bleeding or kidney problems in certain people  If you take blood thinner medicine, always ask your healthcare provider if NSAIDs are safe for you  Always read the medicine label and follow directions  Follow up with your healthcare provider as directed:  Write down your questions so you remember to ask them during your visits  Prevent a tick bite:  Ticks live in areas covered by brush and grass  They may even be found in your lawn if you live in certain areas   Outdoor pets can carry ticks inside the house  Ticks can grab onto you or your clothes when you walk by grass or brush  If you go into areas that contain many trees, tall grasses, and underbrush, do the following:  · Wear light colored pants and a long-sleeved shirt  Tuck your pants into your socks or boots  Tuck in your shirt  Wear sleeves that fit close to the skin at your wrists and neck  This will help prevent ticks from crawling through gaps in your clothing and onto your skin  Wear a hat in areas with trees  · Apply insect repellant on your skin  The insect repellant should contain DEET  Do not put insect repellant on skin that is cut, scratched, or irritated  Always use soap and water to wash the insect repellant off as soon as possible once you are indoors  Do not apply insect repellant on your child's face or hands  · Spray insect repellant onto your clothes  Use permethrin spray  This spray kills ticks that crawl on your clothing  Be sure to spray the tops of your boots, bottom of pant legs, and sleeve cuffs  As soon as possible, wash and dry clothing in hot water and high heat  · Check your and your child's clothing, hair, and skin for ticks  Shower within 2 hours of coming indoors  Carefully check the hairline, armpits, neck, and waist      · Decrease the risk for ticks in your yard  Ticks like to live in shady, moist areas  Gracie Listen your lawn regularly to keep the grass short  Trim the grass around birdbaths and fences  Cut branches that are overgrown and take them out of the yard  Clear out leaf piles  Willie Licoha firewood in a dry, jesus area  · Treat pets with tick control products  as directed  This will decrease your risk for a tick bite  Check your pets for ticks  Remove ticks from pets the same way as you remove them from people  Ask your pet's  about the best product to use on your pet  · Remove a tick with tweezers  Wear gloves  Grasp the tick as close to your skin as possible   Pull the tick straight up and out  Do not touch the tick with your bare hands  Check to make sure you removed the whole tick, including the head  Clean the area with soap and water or rubbing alcohol  Then wash your hands with soap and water  For more information:   · Centers for Disease Control and Prevention (CDC)  [address] , [address]  Phone: 2- 283 - [phone]  Web Address: Ernesto stafford  © 2017 [facility] Information is for End User's use only and may not be sold, redistributed or otherwise used for commercial purposes  All illustrations and images included in CareNotes® are the copyrighted property of A D A M , Inc  or Silverio Hermosillo  The above information is an  only  It is not intended as medical advice for individual conditions or treatments  Talk to your doctor, nurse or pharmacist before following any medical regimen to see if it is safe and effective for you

## 2020-09-16 ENCOUNTER — OFFICE VISIT (OUTPATIENT)
Dept: FAMILY MEDICINE CLINIC | Facility: CLINIC | Age: 25
End: 2020-09-16
Payer: COMMERCIAL

## 2020-09-16 VITALS
SYSTOLIC BLOOD PRESSURE: 100 MMHG | TEMPERATURE: 98.6 F | OXYGEN SATURATION: 97 % | HEART RATE: 70 BPM | DIASTOLIC BLOOD PRESSURE: 60 MMHG | WEIGHT: 121 LBS | BODY MASS INDEX: 19.53 KG/M2

## 2020-09-16 DIAGNOSIS — M79.644 FINGER PAIN, RIGHT: ICD-10-CM

## 2020-09-16 DIAGNOSIS — M79.89 SWELLING OF RIGHT RING FINGER: Primary | ICD-10-CM

## 2020-09-16 PROCEDURE — 4004F PT TOBACCO SCREEN RCVD TLK: CPT | Performed by: FAMILY MEDICINE

## 2020-09-16 PROCEDURE — 99214 OFFICE O/P EST MOD 30 MIN: CPT | Performed by: FAMILY MEDICINE

## 2020-09-16 NOTE — PROGRESS NOTES
Patient placed on continuous cardiac monitor, automatic blood pressure cuff and continuous pulse oximeter.   Subjective:   Chief Complaint   Patient presents with    Hand Pain     pt states his 4th finger on his right hand is painful and swollen, that he noticed 4 days days ago that is getting worse        Patient ID: Julisa Castrejon is a 22 y o  male  The patient is here because he has had about a week of pain in the right 4th finger  It really was not bothering him much, not enough to come in for a visit, until the last day or so  It seems to be getting more swollen and more tender  He is unable to make a fist with his hand, his finger feels very stiff  He is unable to bring his fingers back and extension as this causes pain as well  The pain really is in the joint in the middle of his finger as opposed to the finger itself  He does not have any pain in the actual hand  He denies any trauma, has absolutely no idea why this happened  He has not had any other symptoms  No fevers or chills  No headache  No significant fatigue  No known tick bites or bug bites  He denies any abdominal pain, diarrhea, nausea, vomiting      The following portions of the patient's history were reviewed and updated as appropriate: allergies, current medications, past family history, past medical history, past social history, past surgical history and problem list     Review of Systems          Objective:  Vitals:    09/16/20 1330   BP: 100/60   Pulse: 70   Temp: 98 6 °F (37 °C)   SpO2: 97%   Weight: 54 9 kg (121 lb)      Physical Exam  Constitutional:       General: He is not in acute distress  Appearance: Normal appearance  He is not ill-appearing  Musculoskeletal:      Right hand: He exhibits decreased range of motion, bony tenderness and swelling  He exhibits no deformity  Normal sensation noted  Normal strength noted  Hands:       Comments: There is mild swelling from the point of tenderness, his PIP, into the lower finger and the top part of his hand   Skin:     General: Skin is warm and dry        Findings: No erythema or rash  Neurological:      General: No focal deficit present  Mental Status: He is alert and oriented to person, place, and time  Cranial Nerves: No cranial nerve deficit  Gait: Gait normal    Psychiatric:         Mood and Affect: Mood normal          Behavior: Behavior normal          Thought Content: Thought content normal          Judgment: Judgment normal            Assessment/Plan:    No problem-specific Assessment & Plan notes found for this encounter  At this point I think were going to start with an x-ray  He does not remember injuring anything but it is certainly a possibility  The pain really does seem to be in the joint which is concerning for something like Lyme  Tomorrow he will be able to go and get the x-ray done  If everything is normal and his pain and swelling persist I will likely draw some blood work and possibly start him on doxycycline  It is certainly possible he injured the ligament but I do find that that would be strange without any significant source of injury that he can remember       Diagnoses and all orders for this visit:    Swelling of right ring finger  -     Cancel: XR hand 3+ vw right; Future  -     XR hand 3+ vw right; Future    Finger pain, right  -     Cancel: XR hand 3+ vw right; Future  -     XR hand 3+ vw right; Future

## 2022-04-27 ENCOUNTER — OFFICE VISIT (OUTPATIENT)
Dept: FAMILY MEDICINE CLINIC | Facility: CLINIC | Age: 27
End: 2022-04-27
Payer: COMMERCIAL

## 2022-04-27 ENCOUNTER — APPOINTMENT (EMERGENCY)
Dept: RADIOLOGY | Facility: HOSPITAL | Age: 27
End: 2022-04-27
Payer: COMMERCIAL

## 2022-04-27 ENCOUNTER — HOSPITAL ENCOUNTER (EMERGENCY)
Facility: HOSPITAL | Age: 27
Discharge: HOME | End: 2022-04-27
Attending: EMERGENCY MEDICINE | Admitting: EMERGENCY MEDICINE
Payer: COMMERCIAL

## 2022-04-27 VITALS
RESPIRATION RATE: 20 BRPM | SYSTOLIC BLOOD PRESSURE: 131 MMHG | HEIGHT: 66 IN | BODY MASS INDEX: 20.09 KG/M2 | OXYGEN SATURATION: 99 % | HEART RATE: 89 BPM | DIASTOLIC BLOOD PRESSURE: 72 MMHG | WEIGHT: 125 LBS | TEMPERATURE: 98.4 F

## 2022-04-27 VITALS
BODY MASS INDEX: 20.22 KG/M2 | OXYGEN SATURATION: 97 % | DIASTOLIC BLOOD PRESSURE: 80 MMHG | HEIGHT: 66 IN | SYSTOLIC BLOOD PRESSURE: 110 MMHG | TEMPERATURE: 98 F | WEIGHT: 125.8 LBS | HEART RATE: 95 BPM

## 2022-04-27 DIAGNOSIS — Z13.0 SCREENING FOR IRON DEFICIENCY ANEMIA: ICD-10-CM

## 2022-04-27 DIAGNOSIS — Z13.0 SCREENING FOR ENDOCRINE, NUTRITIONAL, METABOLIC AND IMMUNITY DISORDER: ICD-10-CM

## 2022-04-27 DIAGNOSIS — Z13.220 SCREENING FOR LIPOID DISORDERS: ICD-10-CM

## 2022-04-27 DIAGNOSIS — Z00.00 WELL ADULT EXAM: ICD-10-CM

## 2022-04-27 DIAGNOSIS — Z13.21 SCREENING FOR ENDOCRINE, NUTRITIONAL, METABOLIC AND IMMUNITY DISORDER: ICD-10-CM

## 2022-04-27 DIAGNOSIS — Z13.228 SCREENING FOR ENDOCRINE, NUTRITIONAL, METABOLIC AND IMMUNITY DISORDER: ICD-10-CM

## 2022-04-27 DIAGNOSIS — J06.9 VIRAL URI WITH COUGH: ICD-10-CM

## 2022-04-27 DIAGNOSIS — Z13.29 SCREENING FOR ENDOCRINE, NUTRITIONAL, METABOLIC AND IMMUNITY DISORDER: ICD-10-CM

## 2022-04-27 DIAGNOSIS — Z13.29 SCREENING FOR THYROID DISORDER: ICD-10-CM

## 2022-04-27 DIAGNOSIS — J01.11 ACUTE RECURRENT FRONTAL SINUSITIS: Primary | ICD-10-CM

## 2022-04-27 LAB
ANION GAP SERPL CALCULATED.3IONS-SCNC: 9 MMOL/L (ref 4–13)
BASOPHILS # BLD AUTO: 0.05 THOUSANDS/ΜL (ref 0–0.1)
BASOPHILS NFR BLD AUTO: 0 % (ref 0–1)
BUN SERPL-MCNC: 10 MG/DL (ref 5–25)
CALCIUM SERPL-MCNC: 8.1 MG/DL (ref 8.3–10.1)
CARDIAC TROPONIN I PNL SERPL HS: <2 NG/L
CHLORIDE SERPL-SCNC: 102 MMOL/L (ref 100–108)
CO2 SERPL-SCNC: 26 MMOL/L (ref 21–32)
CREAT SERPL-MCNC: 0.84 MG/DL (ref 0.6–1.3)
EOSINOPHIL # BLD AUTO: 0.11 THOUSAND/ΜL (ref 0–0.61)
EOSINOPHIL NFR BLD AUTO: 1 % (ref 0–6)
ERYTHROCYTE [DISTWIDTH] IN BLOOD BY AUTOMATED COUNT: 12.1 % (ref 11.6–15.1)
FLUAV RNA RESP QL NAA+PROBE: NEGATIVE
FLUBV RNA RESP QL NAA+PROBE: NEGATIVE
GFR SERPL CREATININE-BSD FRML MDRD: 121 ML/MIN/1.73SQ M
GLUCOSE SERPL-MCNC: 82 MG/DL (ref 65–140)
HCT VFR BLD AUTO: 43.1 % (ref 36.5–49.3)
HGB BLD-MCNC: 14.6 G/DL (ref 12–17)
IMM GRANULOCYTES # BLD AUTO: 0.05 THOUSAND/UL (ref 0–0.2)
IMM GRANULOCYTES NFR BLD AUTO: 0 % (ref 0–2)
LYMPHOCYTES # BLD AUTO: 2.11 THOUSANDS/ΜL (ref 0.6–4.47)
LYMPHOCYTES NFR BLD AUTO: 15 % (ref 14–44)
MCH RBC QN AUTO: 30.4 PG (ref 26.8–34.3)
MCHC RBC AUTO-ENTMCNC: 33.9 G/DL (ref 31.4–37.4)
MCV RBC AUTO: 90 FL (ref 82–98)
MONOCYTES # BLD AUTO: 1.37 THOUSAND/ΜL (ref 0.17–1.22)
MONOCYTES NFR BLD AUTO: 10 % (ref 4–12)
NEUTROPHILS # BLD AUTO: 10.14 THOUSANDS/ΜL (ref 1.85–7.62)
NEUTS SEG NFR BLD AUTO: 74 % (ref 43–75)
NRBC BLD AUTO-RTO: 0 /100 WBCS
PLATELET # BLD AUTO: 245 THOUSANDS/UL (ref 149–390)
PMV BLD AUTO: 9.2 FL (ref 8.9–12.7)
POTASSIUM SERPL-SCNC: 3.5 MMOL/L (ref 3.5–5.3)
RBC # BLD AUTO: 4.8 MILLION/UL (ref 3.88–5.62)
RSV RNA RESP QL NAA+PROBE: NEGATIVE
SARS-COV-2 RNA RESP QL NAA+PROBE: NEGATIVE
SODIUM SERPL-SCNC: 137 MMOL/L (ref 136–145)
WBC # BLD AUTO: 13.83 THOUSAND/UL (ref 4.31–10.16)

## 2022-04-27 PROCEDURE — 93005 ELECTROCARDIOGRAM TRACING: CPT

## 2022-04-27 PROCEDURE — 80048 BASIC METABOLIC PNL TOTAL CA: CPT

## 2022-04-27 PROCEDURE — 99213 OFFICE O/P EST LOW 20 MIN: CPT | Performed by: FAMILY MEDICINE

## 2022-04-27 PROCEDURE — 71045 X-RAY EXAM CHEST 1 VIEW: CPT

## 2022-04-27 PROCEDURE — 84484 ASSAY OF TROPONIN QUANT: CPT

## 2022-04-27 PROCEDURE — 36415 COLL VENOUS BLD VENIPUNCTURE: CPT

## 2022-04-27 PROCEDURE — 99395 PREV VISIT EST AGE 18-39: CPT | Performed by: FAMILY MEDICINE

## 2022-04-27 PROCEDURE — 0241U HB NFCT DS VIR RESP RNA 4 TRGT: CPT

## 2022-04-27 PROCEDURE — 99285 EMERGENCY DEPT VISIT HI MDM: CPT | Performed by: EMERGENCY MEDICINE

## 2022-04-27 PROCEDURE — 99284 EMERGENCY DEPT VISIT MOD MDM: CPT

## 2022-04-27 PROCEDURE — 85025 COMPLETE CBC W/AUTO DIFF WBC: CPT

## 2022-04-27 RX ORDER — CEFUROXIME AXETIL 250 MG/1
250 TABLET ORAL EVERY 12 HOURS SCHEDULED
Qty: 20 TABLET | Refills: 0 | Status: SHIPPED | OUTPATIENT
Start: 2022-04-27 | End: 2022-05-07

## 2022-04-27 RX ORDER — BENZONATATE 100 MG/1
100 CAPSULE ORAL EVERY 8 HOURS
Qty: 21 CAPSULE | Refills: 0 | Status: SHIPPED | OUTPATIENT
Start: 2022-04-27

## 2022-04-27 NOTE — ED PROVIDER NOTES
History  Chief Complaint   Patient presents with    Cough     Pt reports cough and runny nose x few days, PCP told him it was a sinus infection, started to cough more tonight  Mireya Kingsley is a 23 YOM who presents to the ED with upper respiratory congestion and non-bloody productive cough x3 days  He denies fever but reports subjective chills and transient headache earlier that was relieved with ibuprofen  He reports that earlier today he was also experiencing chest pain which he says may have been secondary from coughing  He reports the chest pain lasted approximately 3 hours and has subsided as of his presentation to the ED  He denies dizziness, lightheadedness, SOB, NVD, abdominal pain, urinary issues  Prior to Admission Medications   Prescriptions Last Dose Informant Patient Reported? Taking? cefuroxime (CEFTIN) 250 mg tablet   No No   Sig: Take 1 tablet (250 mg total) by mouth every 12 (twelve) hours for 10 days      Facility-Administered Medications: None       Past Medical History:   Diagnosis Date    Asthma     Lyme disease     Lyme disease     Personal history of methicillin resistant Staphylococcus aureus     Seizures ([facility] [address] )     childhood       Past Surgical History:   Procedure Laterality Date    ANKLE SURGERY      ORIF ANKLE FRACTURE BIMALLEOLAR         Family History   Problem Relation Age of Onset    Hypertension Mother     Asthma Father     Melanoma Paternal Grandfather     Diabetes Maternal Aunt      I have reviewed and agree with the history as documented      E-Cigarette/Vaping    E-Cigarette Use Never User      E-Cigarette/Vaping Substances     Social History     Tobacco Use    Smoking status: Current Every Day Smoker     Packs/day: 1 00     Years: 3 00     Pack years: 3 00     Types: Cigarettes    Smokeless tobacco: Never Used   Vaping Use    Vaping Use: Never used   Substance Use Topics    Alcohol use: No    Drug use: Yes     Types: Marijuana       Review of Systems   Constitutional: Positive for chills  Negative for fever  HENT: Positive for sinus pressure and sinus pain  Negative for sore throat  Eyes: Negative for pain and visual disturbance  Respiratory: Positive for cough  Negative for apnea and shortness of breath  Cardiovascular: Positive for chest pain (Reports chest pain from earlier today  Denies chest pain at current time  )  Negative for palpitations and leg swelling  Gastrointestinal: Negative for abdominal pain, blood in stool, constipation, nausea and vomiting  Genitourinary: Negative for difficulty urinating and hematuria  Musculoskeletal: Negative for arthralgias  Skin: Negative for color change and pallor  Neurological: Positive for headaches (Reports headache yesterday  Improved with ibuprofen  )  Negative for dizziness, syncope and light-headedness  Physical Exam  Physical Exam  Vitals and nursing note reviewed  Constitutional:       General: He is not in acute distress  Appearance: Normal appearance  He is well-developed  He is not ill-appearing  HENT:      Head: Normocephalic and atraumatic  Nose: Rhinorrhea present  Mouth/Throat:      Mouth: Mucous membranes are moist       Pharynx: Oropharynx is clear  Posterior oropharyngeal erythema present  No oropharyngeal exudate  Eyes:      Conjunctiva/sclera: Conjunctivae normal    Cardiovascular:      Rate and Rhythm: Normal rate and regular rhythm  Pulses: Normal pulses  Carotid pulses are 2+ on the right side and 2+ on the left side  Radial pulses are 2+ on the right side and 2+ on the left side  Femoral pulses are 2+ on the right side and 2+ on the left side  Popliteal pulses are 2+ on the right side and 2+ on the left side  Dorsalis pedis pulses are 2+ on the right side and 2+ on the left side  Posterior tibial pulses are 2+ on the right side and 2+ on the left side  Heart sounds: Normal heart sounds   No murmur heard  Pulmonary:      Effort: Pulmonary effort is normal  No respiratory distress  Breath sounds: Normal breath sounds  No decreased breath sounds, wheezing, rhonchi or rales  Chest:      Chest wall: No tenderness  Abdominal:      General: Abdomen is flat  Palpations: Abdomen is soft  Tenderness: There is no abdominal tenderness  Musculoskeletal:         General: Normal range of motion  Cervical back: Normal range of motion and neck supple  Right lower leg: No edema  Left lower leg: No edema  Skin:     General: Skin is warm and dry  Neurological:      General: No focal deficit present  Mental Status: He is alert and oriented to person, place, and time  Vital Signs  ED Triage Vitals [04/27/22 1836]   Temperature Pulse Respirations Blood Pressure SpO2   98 4 °F (36 9 °C) 89 20 131/72 99 %      Temp Source Heart Rate Source Patient Position - Orthostatic VS BP Location FiO2 (%)   Temporal Monitor Sitting Right arm --      Pain Score       No Pain           Vitals:    04/27/22 1836   BP: 131/72   Pulse: 89   Patient Position - Orthostatic VS: Sitting         Visual Acuity      ED Medications  Medications - No data to display    Diagnostic Studies  Results Reviewed     Procedure Component Value Units Date/Time    COVID/FLU/RSV - 2 hour TAT [[id number]]  (Normal) Collected: 04/27/22 1927    Lab Status: Final result Specimen: Nares from Nose Updated: 04/27/22 2011     SARS-CoV-2 Negative     INFLUENZA A PCR Negative     INFLUENZA B PCR Negative     RSV PCR Negative    Narrative:      FOR PEDIATRIC PATIENTS - copy/paste COVID Guidelines URL to browser: [email or web address]    SARS-CoV-2 assay is a Nucleic Acid Amplification assay intended for the  qualitative detection of nucleic acid from SARS-CoV-2 in nasopharyngeal  swabs  Results are for the presumptive identification of SARS-CoV-2 RNA      Positive results are indicative of infection with SARS-CoV-2, the virus  causing COVID-19, but do not rule out bacterial infection or co-infection  with other viruses  Laboratories within the [place] and its  territories are required to report all positive results to the appropriate  public health authorities  Negative results do not preclude SARS-CoV-2  infection and should not be used as the sole basis for treatment or other  patient management decisions  Negative results must be combined with  clinical observations, patient history, and epidemiological information  This test has not been FDA cleared or approved  This test has been authorized by FDA under an Emergency Use Authorization  (EUA)  This test is only authorized for the duration of time the  declaration that circumstances exist justifying the authorization of the  emergency use of an in vitro diagnostic tests for detection of SARS-CoV-2  virus and/or diagnosis of COVID-19 infection under section 564(b)(1) of  the Act, 21 U  S C  139BSP-4(A)(3), unless the authorization is terminated  or revoked sooner  The test has been validated but independent review by FDA  and CLIA is pending  Test performed using [employer] GeneXpert: This RT-PCR assay targets N2,  a region unique to SARS-CoV-2  A conserved region in the E-gene was chosen  for pan-Sarbecovirus detection which includes SARS-CoV-2      HS Troponin 0hr (reflex protocol) [[id number]]  (Normal) Collected: 04/27/22 1927    Lab Status: Final result Specimen: Blood from Arm, Right Updated: 04/27/22 2004     hs TnI 0hr <2 ng/L     Basic metabolic panel [[id number]]  (Abnormal) Collected: 04/27/22 1927    Lab Status: Final result Specimen: Blood from Arm, Right Updated: 04/27/22 1949     Sodium 137 mmol/L      Potassium 3 5 mmol/L      Chloride 102 mmol/L      CO2 26 mmol/L      ANION GAP 9 mmol/L      BUN 10 mg/dL      Creatinine 0 84 mg/dL      Glucose 82 mg/dL      Calcium 8 1 mg/dL      eGFR 121 ml/min/1 73sq m     Narrative:      National Kidney Disease Foundation guidelines for Chronic Kidney Disease (CKD):     Stage 1 with normal or high GFR (GFR > 90 mL/min/1 73 square meters)    Stage 2 Mild CKD (GFR = 60-89 mL/min/1 73 square meters)    Stage 3A Moderate CKD (GFR = 45-59 mL/min/1 73 square meters)    Stage 3B Moderate CKD (GFR = 30-44 mL/min/1 73 square meters)    Stage 4 Severe CKD (GFR = 15-29 mL/min/1 73 square meters)    Stage 5 End Stage CKD (GFR <15 mL/min/1 73 square meters)  Note: GFR calculation is accurate only with a steady state creatinine    CBC and differential [[id number]]  (Abnormal) Collected: 04/27/22 1927    Lab Status: Final result Specimen: Blood from Arm, Right Updated: 04/27/22 1933     WBC 13 83 Thousand/uL      RBC 4 80 Million/uL      Hemoglobin 14 6 g/dL      Hematocrit 43 1 %      MCV 90 fL      MCH 30 4 pg      MCHC 33 9 g/dL      RDW 12 1 %      MPV 9 2 fL      Platelets 321 Thousands/uL      nRBC 0 /100 WBCs      Neutrophils Relative 74 %      Immat GRANS % 0 %      Lymphocytes Relative 15 %      Monocytes Relative 10 %      Eosinophils Relative 1 %      Basophils Relative 0 %      Neutrophils Absolute 10 14 Thousands/µL      Immature Grans Absolute 0 05 Thousand/uL      Lymphocytes Absolute 2 11 Thousands/µL      Monocytes Absolute 1 37 Thousand/µL      Eosinophils Absolute 0 11 Thousand/µL      Basophils Absolute 0 05 Thousands/µL                  X-ray chest 1 view portable   ED Interpretation by Savannah Duron PA-C (04/27 1925)   No acute cardiopulmonary disease appreciated                 Procedures  ECG 12 Lead Documentation Only    Date/Time: 4/27/2022 7:47 PM  Performed by: Savannah Duron PA-C  Authorized by: Savannah Duron PA-C     Indications / Diagnosis:  Chest pain  ECG reviewed by me, the ED Provider: yes    Patient location:  ED  Previous ECG:     Previous ECG:  Compared to current    Similarity:  Changes noted (incomplete right bundle branch block)  Interpretation:     Interpretation: non-specific    Rate:     ECG rate:  87    ECG rate assessment: normal    Rhythm:     Rhythm: sinus rhythm    Ectopy:     Ectopy: none    QRS:     QRS axis:  Right  ST segments:     ST segments:  Normal  T waves:     T waves: normal               ED Course                       PERC Rule for PE      Most Recent Value   PERC Rule for PE    Age >=50 0 Filed at: 04/27/2022 1907   HR >=100 0 Filed at: 04/27/2022 1907   O2 Sat on room air < 95% 0 Filed at: 04/27/2022 1907   History of PE or DVT 0 Filed at: 04/27/2022 1907   Recent trauma or surgery 0 Filed at: 04/27/2022 1907   Hemoptysis 0 Filed at: 04/27/2022 1907   Exogenous estrogen 0 Filed at: 04/27/2022 1907   Unilateral leg swelling 0 Filed at: 04/27/2022 1907   PERC Rule for PE Results 0 Filed at: 04/27/2022 1907              SBIRT 22yo+      Most Recent Value   SBIRT (25 yo +)    In order to provide better care to our patients, we are screening all of our patients for alcohol and drug use  Would it be okay to ask you these screening questions? No Filed at: 04/27/2022 1849                    MDM  Number of Diagnoses or Management Options  Viral URI with cough: new and requires workup  Diagnosis management comments: Patient received in sign-out from Pradeep Yates PA-C, pending test results  He is in no respiratory distress at the time of reeval   Will discharge to home with a prescription for [facility] ([place], [place])  Patient will follow-up with primary care physician         Amount and/or Complexity of Data Reviewed  Clinical lab tests: ordered and reviewed  Tests in the radiology section of CPT®: ordered and reviewed    Risk of Complications, Morbidity, and/or Mortality  Presenting problems: high  Diagnostic procedures: high  Management options: high    Patient Progress  Patient progress: stable      Disposition  Final diagnoses:   Viral URI with cough     Time reflects when diagnosis was documented in both MDM as applicable and the Disposition within this note     Time User Action Codes Description Comment    4/27/2022  8:29 PM Gato Lambert Add [J06 9] Viral URI with cough     4/27/2022  8:34 PM Gato Lambert Modify [J06 9] Viral URI with cough       ED Disposition     ED Disposition Condition Date/Time Comment    Discharge Stable Wed Apr 27, 2022  8:30 PM Tasneem Lechuga discharge to home/self care  Follow-up Information     Follow up With Specialties Details Why Bhargavi Richter MD Family Medicine Schedule an appointment as soon as possible for a visit in 2 days for follow up [address]  [facility] [facility]  [phone]            Discharge Medication List as of 4/27/2022  8:34 PM      START taking these medications    Details   benzonatate (TESSALON PERLES) 100 mg capsule Take 1 capsule (100 mg total) by mouth every 8 (eight) hours, Starting Wed 4/27/2022, Normal         CONTINUE these medications which have NOT CHANGED    Details   cefuroxime (CEFTIN) 250 mg tablet Take 1 tablet (250 mg total) by mouth every 12 (twelve) hours for 10 days, Starting Wed 4/27/2022, Until Sat 5/7/2022, Normal             No discharge procedures on file      PDMP Review       Value Time User    PDMP Reviewed  Yes 9/28/2019 10:33 AM Bindu Liang MD          ED Provider  Electronically Signed by           Sera Bear DO  04/28/22 0025

## 2022-04-27 NOTE — PROGRESS NOTES
Subjective     Kaiden Lawrence is a 23 y o   male and is here for routine health maintenance  The patient reports sinus symptoms    History of Present Illness     Pt is here for physical and sinus symptoms      Well Adult Physical   Patient here for a comprehensive physical exam       Diet and Physical Activity  Diet: well balanced diet  Weight concerns: None, patient's BMI is between 18 5-24 9  Exercise: intermittently      Depression Screen  PHQ-2/9 Depression Screening    Little interest or pleasure in doing things: 0 - not at all  Feeling down, depressed, or hopeless: 0 - not at all  PHQ-2 Score: 0  PHQ-2 Interpretation: Negative depression screen          General Health  Hearing: Normal:  bilateral  Vision: no vision problems  Dental: regular dental visits      Cancer Screening  Colononoscopy not indicated  PSA not indicated    Smoker yes  Annual screening with low-dose helical computed tomography (CT) for patients age 54 to 76 years with history of smoking at least 30 pack-years and, if a former smoker, had quit within the previous 15 years      The following portions of the patient's history were reviewed and updated as appropriate: allergies, current medications, past family history, past medical history, past social history, past surgical history and problem list     Review of Systems     Review of Systems   Constitutional: Positive for fatigue  Negative for fever  HENT: Positive for congestion  Eyes: Negative  Respiratory: Positive for cough  Cardiovascular: Negative  Gastrointestinal: Negative  Endocrine: Negative  Genitourinary: Negative  Musculoskeletal: Negative  Skin: Negative  Allergic/Immunologic: Negative  Neurological: Positive for headaches  Psychiatric/Behavioral: Negative          Past Medical History     Past Medical History:   Diagnosis Date    Asthma     Lyme disease     Lyme disease     Personal history of methicillin resistant Staphylococcus aureus  Seizures ([facility] [address] )     childhood       Past Surgical History     Past Surgical History:   Procedure Laterality Date    ANKLE SURGERY      ORIF ANKLE FRACTURE BIMALLEOLAR         Social History     Social History     Socioeconomic History    Marital status: Single     Spouse name: None    Number of children: None    Years of education: None    Highest education level: None   Occupational History    None   Tobacco Use    Smoking status: Current Every Day Smoker     Packs/day: 1 00     Years: 3 00     Pack years: 3 00     Types: Cigarettes    Smokeless tobacco: Never Used   Vaping Use    Vaping Use: Never used   Substance and Sexual Activity    Alcohol use: No    Drug use: Yes     Types: Marijuana    Sexual activity: Yes     Partners: Female   Other Topics Concern    None   Social History Narrative    None     Social Determinants of Health     Financial Resource Strain: Not on file   Food Insecurity: Not on file   Transportation Needs: Not on file   Physical Activity: Not on file   Stress: Not on file   Social Connections: Not on file   Intimate Partner Violence: Not on file   Housing Stability: Not on file       Family History     Family History   Problem Relation Age of Onset    Hypertension Mother     Asthma Father     Melanoma Paternal Grandfather     Diabetes Maternal Aunt        Current Medications       Current Outpatient Medications:     benzonatate (TESSALON PERLES) 100 mg capsule, Take 1 capsule (100 mg total) by mouth every 8 (eight) hours, Disp: 21 capsule, Rfl: 0    cefuroxime (CEFTIN) 250 mg tablet, Take 1 tablet (250 mg total) by mouth every 12 (twelve) hours for 10 days, Disp: 20 tablet, Rfl: 0     Allergies     No Known Allergies    Objective     /80   Pulse 95   Temp 98 °F (36 7 °C)   Ht 5' 6" (1 676 m)   Wt 57 1 kg (125 lb 12 8 oz)   SpO2 97%   BMI 20 30 kg/m²      Physical Exam  Vitals and nursing note reviewed     Constitutional:       Appearance: He is well-developed  HENT:      Head: Normocephalic  Right Ear: External ear normal       Left Ear: External ear normal       Nose: Nose normal    Eyes:      Conjunctiva/sclera: Conjunctivae normal       Pupils: Pupils are equal, round, and reactive to light  Cardiovascular:      Rate and Rhythm: Normal rate and regular rhythm  Heart sounds: Normal heart sounds  Pulmonary:      Effort: Pulmonary effort is normal       Breath sounds: Normal breath sounds  Abdominal:      General: Bowel sounds are normal       Palpations: Abdomen is soft  Musculoskeletal:      Cervical back: Normal range of motion and neck supple  Skin:     General: Skin is warm and dry  Neurological:      Mental Status: He is alert and oriented to person, place, and time  Psychiatric:         Behavior: Behavior normal          Thought Content: Thought content normal          Judgment: Judgment normal            No exam data present    Health Maintenance     Health Maintenance   Topic Date Due    COVID-19 Vaccine (1) Never done    Pneumococcal Vaccine: Pediatrics (0 to 5 Years) and At-Risk Patients (6 to 59 Years) (1 of 2 - PPSV23) Never done    HPV Vaccine (1 - Male 2-dose series) Never done    HIV Screening  Never done    Hepatitis C Screening  05/27/2024 (Originally 1996)    Influenza Vaccine (Season Ended) 09/01/2022    Depression Screening  04/27/2023    BMI: Adult  04/27/2023    Annual Physical  04/27/2023    DTaP,Tdap,and Td Vaccines (3 - Td or Tdap) 09/26/2029    HIB Vaccine  Aged Out    Hepatitis B Vaccine  Aged Out    IPV Vaccine  Aged Out    Hepatitis A Vaccine  Aged Out    Meningococcal ACWY Vaccine  Aged Out     Immunization History   Administered Date(s) Administered    Tdap 03/11/2019, 09/26/2019       Assessment/Plan       1   Healthy male exam   2  Patient Counseling:   · Nutrition: Stressed importance of a well balanced diet, moderation of sodium/saturated fat, caloric balance and sufficient intake of fiber  · Exercise: Stressed the importance of regular exercise with a goal of 150 minutes per week  · Dental Health: Discussed daily flossing and brushing and regular dental visits     · Immunizations reviewed  · Discussed benefits of screening   · Discussed the patient's BMI with him  The BMI is in the acceptable range  3  Cancer Screening   4  Labs -due for labs  5  Cefuroxime for sinuses  6  Follow up in one year      Evangelist Cabrera DO

## 2022-04-27 NOTE — PATIENT INSTRUCTIONS
Cefuroxime 1 tab twice a day       Wellness Visit for Adults   AMBULATORY CARE:   A wellness visit  is when you see your healthcare provider to get screened for health problems  Your healthcare provider will also give you advice on how to stay healthy  Write down your questions so you remember to ask them  Ask your healthcare provider how often you should have a wellness visit  What happens at a wellness visit:  Your healthcare provider will ask about your health, and your family history of health problems  This includes high blood pressure, heart disease, and cancer  He or she will ask if you have symptoms that concern you, if you smoke, and about your mood  You may also be asked about your intake of medicines, supplements, food, and alcohol  Any of the following may be done:  · Your weight  will be checked  Your height may also be checked so your body mass index (BMI) can be calculated  Your BMI shows if you are at a healthy weight  · Your blood pressure  and heart rate will be checked  Your temperature may also be checked  · Blood and urine tests  may be done  Blood tests may be done to check your cholesterol levels  Abnormal cholesterol levels increase your risk for heart disease and stroke  You may also need a blood or urine test to check for diabetes if you are at increased risk  Urine tests may be done to look for signs of an infection or kidney disease  · A physical exam  includes checking your heartbeat and lungs with a stethoscope  Your healthcare provider may also check your skin to look for sun damage  · Screening tests  may be recommended  A screening test is done to check for diseases that may not cause symptoms  The screening tests you may need depend on your age, gender, family history, and lifestyle habits  For example, colorectal screening may be recommended if you are 48years old or older      Screening tests you need if you are a woman:   · A Pap smear  is used to screen for cervical cancer  Pap smears are usually done every 3 to 5 years depending on your age  You may need them more often if you have had abnormal Pap smear test results in the past  Ask your healthcare provider how often you should have a Pap smear  · A mammogram  is an x-ray of your breasts to screen for breast cancer  Experts recommend mammograms every 2 years starting at age 48 years  You may need a mammogram at age 52 years or younger if you have an increased risk for breast cancer  Talk to your healthcare provider about when you should start having mammograms and how often you need them  Vaccines you may need:   · Get an influenza vaccine  every year  The influenza vaccine protects you from the flu  Several types of viruses cause the flu  The viruses change over time, so new vaccines are made each year  · Get a tetanus-diphtheria (Td) booster vaccine  every 10 years  This vaccine protects you against tetanus and diphtheria  Tetanus is a severe infection that may cause painful muscle spasms and lockjaw  Diphtheria is a severe bacterial infection that causes a thick covering in the back of your mouth and throat  · Get a human papillomavirus (HPV) vaccine  if you are female and aged 23 to 32 or male 23 to 24 and never received it  This vaccine protects you from HPV infection  HPV is the most common infection spread by sexual contact  HPV may also cause vaginal, penile, and anal cancers  · Get a pneumococcal vaccine  if you are aged 72 years or older  The pneumococcal vaccine is an injection given to protect you from pneumococcal disease  Pneumococcal disease is an infection caused by pneumococcal bacteria  The infection may cause pneumonia, meningitis, or an ear infection  · Get a shingles vaccine  if you are 60 or older, even if you have had shingles before  The shingles vaccine is an injection to protect you from the varicella-zoster virus  This is the same virus that causes chickenpox   Shingles is a painful rash that develops in people who had chickenpox or have been exposed to the virus  How to eat healthy:  My Plate is a model for planning healthy meals  It shows the types and amounts of foods that should go on your plate  Fruits and vegetables make up about half of your plate, and grains and protein make up the other half  A serving of dairy is included on the side of your plate  The amount of calories and serving sizes you need depends on your age, gender, weight, and height  Examples of healthy foods are listed below:  · Eat a variety of vegetables  such as dark green, red, and orange vegetables  You can also include canned vegetables low in sodium (salt) and frozen vegetables without added butter or sauces  · Eat a variety of fresh fruits , canned fruit in 100% juice, frozen fruit, and dried fruit  · Include whole grains  At least half of the grains you eat should be whole grains  Examples include whole-wheat bread, wheat pasta, brown rice, and whole-grain cereals such as oatmeal     · Eat a variety of protein foods such as seafood (fish and shellfish), lean meat, and poultry without skin (turkey and chicken)  Examples of lean meats include pork leg, shoulder, or tenderloin, and beef round, sirloin, tenderloin, and extra lean ground beef  Other protein foods include eggs and egg substitutes, beans, peas, soy products, nuts, and seeds  · Choose low-fat dairy products such as skim or 1% milk or low-fat yogurt, cheese, and cottage cheese  · Limit unhealthy fats  such as butter, hard margarine, and shortening  Exercise:  Exercise at least 30 minutes per day on most days of the week  Some examples of exercise include walking, biking, dancing, and swimming  You can also fit in more physical activity by taking the stairs instead of the elevator or parking farther away from stores  Include muscle strengthening activities 2 days each week  Regular exercise provides many health benefits   It helps you manage your weight, and decreases your risk for type 2 diabetes, heart disease, stroke, and high blood pressure  Exercise can also help improve your mood  Ask your healthcare provider about the best exercise plan for you  General health and safety guidelines:   · Do not smoke  Nicotine and other chemicals in cigarettes and cigars can cause lung damage  Ask your healthcare provider for information if you currently smoke and need help to quit  E-cigarettes or smokeless tobacco still contain nicotine  Talk to your healthcare provider before you use these products  · Limit alcohol  A drink of alcohol is 12 ounces of beer, 5 ounces of wine, or 1½ ounces of liquor  · Lose weight, if needed  Being overweight increases your risk of certain health conditions  These include heart disease, high blood pressure, type 2 diabetes, and certain types of cancer  · Protect your skin  Do not sunbathe or use tanning beds  Use sunscreen with a SPF 15 or higher  Apply sunscreen at least 15 minutes before you go outside  Reapply sunscreen every 2 hours  Wear protective clothing, hats, and sunglasses when you are outside  · Drive safely  Always wear your seatbelt  Make sure everyone in your car wears a seatbelt  A seatbelt can save your life if you are in an accident  Do not use your cell phone when you are driving  This could distract you and cause an accident  Pull over if you need to make a call or send a text message  · Practice safe sex  Use latex condoms if are sexually active and have more than one partner  Your healthcare provider may recommend screening tests for sexually transmitted infections (STIs)  · Wear helmets, lifejackets, and protective gear  Always wear a helmet when you ride a bike or motorcycle, go skiing, or play sports that could cause a head injury  Wear protective equipment when you play sports  Wear a lifejacket when you are on a boat or doing water sports      © Copyright [employer] 2022 Information is for [employer] use only and may not be sold, redistributed or otherwise used for commercial purposes  All illustrations and images included in CareNotes® are the copyrighted property of A D A M , Inc  or [employer]ak [employer]  The above information is an  only  It is not intended as medical advice for individual conditions or treatments  Talk to your doctor, nurse or pharmacist before following any medical regimen to see if it is safe and effective for you

## 2022-04-27 NOTE — PROGRESS NOTES
Assessment/Plan:      1  Acute recurrent frontal sinusitis  -     cefuroxime (CEFTIN) 250 mg tablet; Take 1 tablet (250 mg total) by mouth every 12 (twelve) hours for 10 days    2  Well adult exam    3  Screening for iron deficiency anemia  -     CBC and differential; Future  -     CBC and differential    4  Screening for lipoid disorders  -     Lipid panel; Future  -     Lipid panel    5  Screening for thyroid disorder  -     TSH, 3rd generation with Free T4 reflex; Future  -     TSH, 3rd generation with Free T4 reflex    6  Screening for endocrine, nutritional, metabolic and immunity disorder  -     Comprehensive metabolic panel; Future  -     Comprehensive metabolic panel          Subjective:  Chief Complaint   Patient presents with    Follow-up     running nose, stuff out in his nose, pain like pounded on his head    Physical Exam     due for physical         Patient ID: Mery Felipe is a 23 y o  male  Complains of symptoms started over the weekend with head congestion  nyquil taking at bedtime, ibuprofen as needed for headache  Review of Systems   Constitutional: Positive for fatigue  Negative for fever  HENT: Positive for congestion  Eyes: Negative  Respiratory: Positive for cough  Cardiovascular: Negative  Gastrointestinal: Negative  Endocrine: Negative  Genitourinary: Negative  Musculoskeletal: Negative  Skin: Negative  Allergic/Immunologic: Negative  Neurological: Negative  Psychiatric/Behavioral: Negative  The following portions of the patient's history were reviewed and updated as appropriate: allergies, current medications, past family history, past medical history, past social history, past surgical history and problem list     Objective:  Vitals:    04/27/22 1011   BP: 110/80   Pulse: 95   Temp: 98 °F (36 7 °C)   SpO2: 97%   Weight: 57 1 kg (125 lb 12 8 oz)   Height: 5' 6" (1 676 m)      Physical Exam  Vitals and nursing note reviewed  Constitutional:       Appearance: He is well-developed  HENT:      Head: Normocephalic and atraumatic  Nose: Congestion present  Cardiovascular:      Rate and Rhythm: Normal rate and regular rhythm  Heart sounds: Normal heart sounds  Pulmonary:      Effort: Pulmonary effort is normal       Breath sounds: Normal breath sounds  Abdominal:      General: Bowel sounds are normal       Palpations: Abdomen is soft  Skin:     General: Skin is warm and dry  Neurological:      Mental Status: He is alert and oriented to person, place, and time  Psychiatric:         Behavior: Behavior normal          Thought Content:  Thought content normal          Judgment: Judgment normal

## 2022-04-28 LAB
ATRIAL RATE: 87 BPM
P AXIS: 78 DEGREES
PR INTERVAL: 142 MS
QRS AXIS: 106 DEGREES
QRSD INTERVAL: 100 MS
QT INTERVAL: 356 MS
QTC INTERVAL: 428 MS
T WAVE AXIS: 62 DEGREES
VENTRICULAR RATE: 87 BPM

## 2022-04-28 PROCEDURE — 93010 ELECTROCARDIOGRAM REPORT: CPT | Performed by: INTERNAL MEDICINE

## 2022-05-02 PROBLEM — Z00.00 WELL ADULT EXAM: Status: ACTIVE | Noted: 2022-05-02

## 2022-05-02 PROBLEM — J01.11 ACUTE RECURRENT FRONTAL SINUSITIS: Status: ACTIVE | Noted: 2022-05-02

## 2022-05-10 ENCOUNTER — TELEPHONE (OUTPATIENT)
Dept: FAMILY MEDICINE CLINIC | Facility: CLINIC | Age: 27
End: 2022-05-10

## 2022-05-10 DIAGNOSIS — J01.11 ACUTE RECURRENT FRONTAL SINUSITIS: Primary | ICD-10-CM

## 2022-05-10 RX ORDER — SULFAMETHOXAZOLE AND TRIMETHOPRIM 800; 160 MG/1; MG/1
1 TABLET ORAL EVERY 12 HOURS SCHEDULED
Qty: 20 TABLET | Refills: 0 | Status: SHIPPED | OUTPATIENT
Start: 2022-05-10 | End: 2022-05-20

## 2022-05-10 NOTE — TELEPHONE ENCOUNTER
Pt call said he been coughing still and never went away since he been on the antibiotics he said that the antibiotics never help and he still coughing with green muscus  and yellow and runny nose   Maybe call in something else for him       He seen you two week ago   pharmcy is [facility] in [facility] [phone]

## 2022-10-11 PROBLEM — J01.11 ACUTE RECURRENT FRONTAL SINUSITIS: Status: RESOLVED | Noted: 2022-05-02 | Resolved: 2022-10-11

## 2022-10-11 PROBLEM — Z00.00 WELL ADULT EXAM: Status: RESOLVED | Noted: 2022-05-02 | Resolved: 2022-10-11

## 2023-04-14 PROBLEM — B35.3 ATHLETE'S FOOT ON RIGHT: Status: ACTIVE | Noted: 2023-04-14

## 2023-04-28 ENCOUNTER — TELEPHONE (OUTPATIENT)
Dept: FAMILY MEDICINE CLINIC | Facility: CLINIC | Age: 28
End: 2023-04-28

## 2023-04-28 DIAGNOSIS — B35.3 ATHLETE'S FOOT ON RIGHT: ICD-10-CM

## 2023-04-28 DIAGNOSIS — L03.115 CELLULITIS OF RIGHT FOOT: Primary | ICD-10-CM

## 2023-04-28 RX ORDER — CEPHALEXIN 500 MG/1
500 CAPSULE ORAL EVERY 12 HOURS SCHEDULED
Qty: 14 CAPSULE | Refills: 0 | Status: SHIPPED | OUTPATIENT
Start: 2023-04-28 | End: 2023-05-05

## 2023-04-28 NOTE — TELEPHONE ENCOUNTER
Pt given number for podiatry and they called already  They were able to get him in today  If you could please place an AMB referral for him for the [facility] location that would be karri!

## 2023-04-28 NOTE — TELEPHONE ENCOUNTER
"Mom called back to state that they cancelled his appointment due to the dr not being in the office  He will now not be seen until may 5th  Mom states that she will be taking him to the ED as he \"can barely walk its so bad\"  Pt wondering what to do in the meantime  I advised that I would ask his provider and return their call    "

## 2023-04-28 NOTE — TELEPHONE ENCOUNTER
Will send in another round of keflex as that helped and will hopefully hold him until his appointment next week   Referral placed

## 2023-04-28 NOTE — TELEPHONE ENCOUNTER
"Pt called stating that he completed the medication that he was given for his feet  \"It cleared up for a little bit but it's back in full force now  \"    Wondering what to do next  Please advise    "

## 2023-05-05 ENCOUNTER — OFFICE VISIT (OUTPATIENT)
Dept: PODIATRY | Facility: CLINIC | Age: 28
End: 2023-05-05

## 2023-05-05 VITALS
HEART RATE: 116 BPM | BODY MASS INDEX: 21.38 KG/M2 | HEIGHT: 66 IN | DIASTOLIC BLOOD PRESSURE: 85 MMHG | SYSTOLIC BLOOD PRESSURE: 136 MMHG | WEIGHT: 133 LBS

## 2023-05-05 DIAGNOSIS — B35.3 ATHLETE'S FOOT ON RIGHT: ICD-10-CM

## 2023-05-05 DIAGNOSIS — L03.115 CELLULITIS OF RIGHT FOOT: ICD-10-CM

## 2023-05-05 DIAGNOSIS — L74.513 HYPERHIDROSIS OF FEET: Primary | ICD-10-CM

## 2023-05-05 NOTE — PROGRESS NOTES
"Assessment/Plan:     Hyperhidrosis of feet  -     Recommend patient apply antiperspirant deodorant gel odorless to both feet from heel to toes on a daily basis for approximately 2 to 3 weeks  May then back off to QOD for 1-2wks, until he finds the minimum application that is necessary to manage his chronic perspiration issue   - Wool synthetic blend socks recommended as they will manage the perspiration better and help maintain the integrity of his skin  - Must alternate shoe gear every other day as his current regimen does not allow adequate time for them to dry overnight  Consider a boot dryer to help this  Patient wears work boots all day and should consider acquiring a second pair  Cellulitis of right foot  -     Ambulatory Referral to Podiatry  - Resolved    Athlete's foot on right  -     Ambulatory Referral to [address] [place], [place] [address]ri[place] resolved for now        Subjective:      Patient ID: Andrew Rater is a 33 y o  male  115/2023: 67-year-old male presents concerned with recurrent skin issues involving both of his feet  Has been taking antibiotics and using different creams which did not seem to provide adequate relief  Reports his skin improves and then seems to recur  The following portions of the patient's history were reviewed and updated as appropriate: allergies, current medications, past family history, past medical history, past social history, past surgical history and problem list     Review of Systems      Objective:      /85   Pulse (!) 116   Ht 5' 6\" (1 676 m)   Wt 60 3 kg (133 lb)   BMI 21 47 kg/m²          Physical Exam  Constitutional:       Appearance: Normal appearance  HENT:      Head: Normocephalic  Right Ear: Tympanic membrane normal       Left Ear: Tympanic membrane normal       Nose: No congestion  Mouth/Throat:      Mouth: Mucous membranes are moist       Pharynx: No oropharyngeal exudate or posterior oropharyngeal erythema     Eyes:      Extraocular " Movements: Extraocular movements intact  Conjunctiva/sclera: Conjunctivae normal       Pupils: Pupils are equal, round, and reactive to light  Cardiovascular:      Rate and Rhythm: Normal rate and regular rhythm  Pulses: Normal pulses  Pulmonary:      Effort: Pulmonary effort is normal    Abdominal:      Palpations: Abdomen is soft  Skin:     General: Skin is warm and dry  Capillary Refill: Capillary refill takes 2 to 3 seconds  Coloration: Skin is not pale  Findings: Erythema present  No bruising, lesion or rash  Comments: Texture tone and turgor are within normal limits, digital hair noted  Excess of perspiration noted bilateral feet with erythema/dermatitis most likely secondary to excessive perspiration  No beckie cellulitis or bacterial infection noted  No calor noted  Subtle malodor noted    No scaling or erythematous patches of skin noted, tinea appears to have resolved  Neurological:      General: No focal deficit present  Mental Status: He is alert  Cranial Nerves: No cranial nerve deficit  Sensory: No sensory deficit  Motor: No weakness        Gait: Gait normal       Deep Tendon Reflexes: Reflexes normal    Psychiatric:         Mood and Affect: Mood normal          Behavior: Behavior normal          Judgment: Judgment normal

## 2023-05-05 NOTE — LETTER
May 6, 2023     Shela Spur[facility] [address] [place], [place] [address] [id number]    Patient: Ambreen Hidalgo   YOB: 1995   Date of Visit: 5/5/2023       Dear Dr Li Peñaloza:    Thank you for referring Ambreen Hidalgo to me for evaluation  Below are my notes for this consultation  If you have questions, please do not hesitate to call me  I look forward to following your patient along with you  Sincerely,        Leigh Feliciano DPM        CC: No Recipients  BUDDY [facility]  5/6/2023  9:30 AM  Signed  Assessment/Plan:     Hyperhidrosis of feet  -     Recommend patient apply antiperspirant deodorant gel odorless to both feet from heel to toes on a daily basis for approximately 2 to 3 weeks  May then back off to QOD for 1-2wks, until he finds the minimum application that is necessary to manage his chronic perspiration issue   - Wool synthetic blend socks recommended as they will manage the perspiration better and help maintain the integrity of his skin  - Must alternate shoe gear every other day as his current regimen does not allow adequate time for them to dry overnight  Consider a boot dryer to help this  Patient wears work boots all day and should consider acquiring a second pair  Cellulitis of right foot  -     Ambulatory Referral to Podiatry  - Resolved    Athlete's foot on right  -     Ambulatory Referral to [address] [place] [address] [facility] resolved for now       Subjective:     Patient ID: Ambreen Hidalgo is a 33 y o  male  115/2023: 72-year-old male presents concerned with recurrent skin issues involving both of his feet  Has been taking antibiotics and using different creams which did not seem to provide adequate relief  Reports his skin improves and then seems to recur        The following portions of the patient's history were reviewed and updated as appropriate: allergies, current medications, past family history, past medical history, past social history, past "surgical history and problem list     Review of Systems     Objective:      /85   Pulse (!) 116   Ht 5' 6\" (1 676 m)   Wt 60 3 kg (133 lb)   BMI 21 47 kg/m²         Physical Exam  Constitutional:       Appearance: Normal appearance  HENT:      Head: Normocephalic  Right Ear: Tympanic membrane normal       Left Ear: Tympanic membrane normal       Nose: No congestion  Mouth/Throat:      Mouth: Mucous membranes are moist       Pharynx: No oropharyngeal exudate or posterior oropharyngeal erythema  Eyes:      Extraocular Movements: Extraocular movements intact  Conjunctiva/sclera: Conjunctivae normal       Pupils: Pupils are equal, round, and reactive to light  Cardiovascular:      Rate and Rhythm: Normal rate and regular rhythm  Pulses: Normal pulses  Pulmonary:      Effort: Pulmonary effort is normal    Abdominal:      Palpations: Abdomen is soft  Skin:     General: Skin is warm and dry  Capillary Refill: Capillary refill takes 2 to 3 seconds  Coloration: Skin is not pale  Findings: Erythema present  No bruising, lesion or rash  Comments: Texture tone and turgor are within normal limits, digital hair noted  Excess of perspiration noted bilateral feet with erythema/dermatitis most likely secondary to excessive perspiration  No beckie cellulitis or bacterial infection noted  No calor noted  Subtle malodor noted    No scaling or erythematous patches of skin noted, tinea appears to have resolved  Neurological:      General: No focal deficit present  Mental Status: He is alert  Cranial Nerves: No cranial nerve deficit  Sensory: No sensory deficit  Motor: No weakness        Gait: Gait normal       Deep Tendon Reflexes: Reflexes normal    Psychiatric:         Mood and Affect: Mood normal          Behavior: Behavior normal          Judgment: Judgment normal           "

## 2023-06-02 ENCOUNTER — OFFICE VISIT (OUTPATIENT)
Age: 28
End: 2023-06-02

## 2023-06-02 ENCOUNTER — APPOINTMENT (OUTPATIENT)
Age: 28
End: 2023-06-02
Payer: COMMERCIAL

## 2023-06-02 VITALS
WEIGHT: 126.2 LBS | TEMPERATURE: 97.6 F | DIASTOLIC BLOOD PRESSURE: 80 MMHG | RESPIRATION RATE: 16 BRPM | HEART RATE: 61 BPM | BODY MASS INDEX: 20.28 KG/M2 | SYSTOLIC BLOOD PRESSURE: 138 MMHG | HEIGHT: 66 IN | OXYGEN SATURATION: 100 %

## 2023-06-02 DIAGNOSIS — S90.852A FOREIGN BODY OF FOOT OR TOE, SUPERFICIAL, INFECTED, LEFT, INITIAL ENCOUNTER: ICD-10-CM

## 2023-06-02 DIAGNOSIS — S91.332A PUNCTURE WOUND OF LEFT FOOT, INITIAL ENCOUNTER: Primary | ICD-10-CM

## 2023-06-02 DIAGNOSIS — L08.9 FOREIGN BODY OF FOOT OR TOE, SUPERFICIAL, INFECTED, LEFT, INITIAL ENCOUNTER: ICD-10-CM

## 2023-06-02 PROCEDURE — 73630 X-RAY EXAM OF FOOT: CPT

## 2023-06-02 RX ORDER — CIPROFLOXACIN 750 MG/1
750 TABLET, FILM COATED ORAL EVERY 12 HOURS SCHEDULED
Qty: 14 TABLET | Refills: 0 | Status: SHIPPED | OUTPATIENT
Start: 2023-06-02 | End: 2023-06-09

## 2023-06-02 RX ORDER — CIPROFLOXACIN 750 MG/1
750 TABLET, FILM COATED ORAL EVERY 12 HOURS SCHEDULED
Qty: 14 TABLET | Refills: 0 | Status: SHIPPED | OUTPATIENT
Start: 2023-06-02 | End: 2023-06-02

## 2023-06-02 NOTE — PROGRESS NOTES
[address]        NAME: Honey Fleischer is a 33 y o  male  : 1995    MRN: [id number]  DATE: Catalina 3, 2023  TIME: 9:49 AM    Assessment and Plan   Puncture wound of left foot, initial encounter [S91 332A]  1  Puncture wound of left foot, initial encounter  ciprofloxacin (CIPRO) 750 mg tablet    DISCONTINUED: ciprofloxacin (CIPRO) 750 mg tablet      2  Foreign body of foot or toe, superficial, infected, left, initial encounter  XR foot 3+ vw left         No foreign body visualized on x-ray of foot  Wound cleaned with betadine, bacitracin ointment applied, and covered with band-aid  Cipro prescribed for infection prophlaxis  Tetanus UTD  Patient Instructions     Take antibiotics as directed for next 7 days, complete entire course of therapy even if symptoms are improving  May continue to apply bacitracin ointment daily to site of injury and take tylenol/ibuprofen every 4-6 hours as needed for pain  Follow-up with PCP in 3-5 days if no improvement of symptoms  Report to the ER if symptoms worsen or develop worsening pain, redness, or swelling around site of injury  Chief Complaint     Chief Complaint   Patient presents with   • Foot Injury     Yesterday patient stepped on a rock while In the water  Currently his left foot have and small black ish and complained of pain  History of Present Illness       33year old male presents for evaluation of pain to left foot after he stepped on a rock fishing yesterday  He reports in he was in the river wearing flip flops when the injury happened and noticed pain after returning home  He doesn't believe the rock is still in his foot  His last tetanus immunization was in 2019  He cleaned the wound with hydrogen peroxide and applied antibiotic ointment last night  He reports the pain is intermittent, rated 5/10, and worsened with weight-bearing  He has not yet taken anything for pain  Leg Pain   The incident occurred 12 to 24 hours ago  Incident location[place]  Injury mechanism: stepped on rock  The pain is present in the left foot  The quality of the pain is described as aching  The pain is at a severity of 5/10  The pain is mild  The pain has been intermittent since onset  Associated symptoms include an inability to bear weight  Pertinent negatives include no loss of motion, loss of sensation, muscle weakness, numbness or tingling  It is unknown if a foreign body is present  The symptoms are aggravated by weight bearing, movement and palpation  He has tried nothing for the symptoms  The treatment provided no relief  Review of Systems   Review of Systems   Constitutional: Negative for activity change, appetite change, chills, fatigue and fever  Eyes: Negative for visual disturbance  Respiratory: Negative for chest tightness and shortness of breath  Cardiovascular: Negative for chest pain and palpitations  Gastrointestinal: Negative for abdominal pain, constipation, diarrhea, nausea and vomiting  Musculoskeletal: Negative for arthralgias and myalgias  Skin: Positive for color change and wound  Negative for pallor and rash  Allergic/Immunologic: Negative for environmental allergies and food allergies  Neurological: Negative for dizziness, tingling, light-headedness and numbness           Current Medications       Current Outpatient Medications:   •  ciprofloxacin (CIPRO) 750 mg tablet, Take 1 tablet (750 mg total) by mouth every 12 (twelve) hours for 7 days, Disp: 14 tablet, Rfl: 0  •  ketoconazole (NIZORAL) 2 % cream, Apply topically daily (Patient not taking: Reported on 6/2/2023), Disp: 15 g, Rfl: 0    Current Allergies     Allergies as of 06/02/2023   • (No Known Allergies)            The following portions of the patient's history were reviewed and updated as appropriate: allergies, current medications, past family history, past medical history, past social history, past surgical history and problem list      Past "Medical History:   Diagnosis Date   • Asthma    • Lyme disease    • Lyme disease    • Personal history of methicillin resistant Staphylococcus aureus    • Seizures ([address] )     childhood       Past Surgical History:   Procedure Laterality Date   • ANKLE SURGERY     • ORIF ANKLE FRACTURE BIMALLEOLAR         Family History   Problem Relation Age of Onset   • Hypertension Mother    • Asthma Father    • Melanoma Paternal Grandfather    • Diabetes Maternal Aunt          Medications have been verified  Objective   /80   Pulse 61   Temp 97 6 °F (36 4 °C)   Resp 16   Ht 5' 6\" (1 676 m)   Wt 57 2 kg (126 lb 3 2 oz)   SpO2 100%   BMI 20 37 kg/m²        Physical Exam     Physical Exam  Vitals and nursing note reviewed  Constitutional:       General: He is awake  Appearance: Normal appearance  He is well-developed and normal weight  HENT:      Head: Normocephalic and atraumatic  Right Ear: Hearing normal       Left Ear: Hearing normal       Nose: No congestion or rhinorrhea  Mouth/Throat:      Lips: Pink  Mouth: Mucous membranes are moist    Eyes:      Conjunctiva/sclera: Conjunctivae normal    Cardiovascular:      Rate and Rhythm: Normal rate and regular rhythm  Pulses: Normal pulses  Dorsalis pedis pulses are 2+ on the right side and 2+ on the left side  Posterior tibial pulses are 2+ on the right side and 2+ on the left side  Heart sounds: Normal heart sounds  Pulmonary:      Effort: Pulmonary effort is normal       Breath sounds: Normal breath sounds  Musculoskeletal:         General: Tenderness and signs of injury present  No swelling or deformity  Cervical back: Full passive range of motion without pain, normal range of motion and neck supple  Right lower leg: No edema  Left lower leg: No edema          Feet:    Feet:      Right foot:      Skin integrity: Skin integrity normal       Toenail Condition: Right toenails are normal       Left " foot:      Skin integrity: Skin breakdown present  No ulcer, blister, erythema, warmth, callus, dry skin or fissure  Toenail Condition: Left toenails are normal       Comments: Small <0 5 cm puncture wound to left foot, bruising present, no drainage  Lymphadenopathy:      Cervical: No cervical adenopathy  Skin:     General: Skin is warm and dry  Capillary Refill: Capillary refill takes less than 2 seconds  Findings: Bruising, signs of injury, rash and wound present  No abrasion, abscess, acne, burn, ecchymosis, erythema, laceration, lesion or petechiae  Rash is not crusting, macular, nodular, papular, purpuric, pustular, scaling, urticarial or vesicular  Neurological:      Mental Status: He is alert and oriented to person, place, and time  Psychiatric:         Mood and Affect: Mood normal          Behavior: Behavior normal  Behavior is cooperative  Thought Content:  Thought content normal          Judgment: Judgment normal

## 2023-06-02 NOTE — PATIENT INSTRUCTIONS
Take antibiotics as directed for next 7 days, complete entire course of therapy even if symptoms are improving  May continue to apply bacitracin ointment daily to site of injury and take tylenol/ibuprofen every 4-6 hours as needed for pain  Follow-up with PCP in 3-5 days if no improvement of symptoms  Report to the ER if symptoms worsen or develop worsening pain, redness, or swelling around site of injury

## 2023-11-14 ENCOUNTER — OFFICE VISIT (OUTPATIENT)
Dept: FAMILY MEDICINE CLINIC | Facility: CLINIC | Age: 28
End: 2023-11-14
Payer: COMMERCIAL

## 2023-11-14 VITALS
SYSTOLIC BLOOD PRESSURE: 110 MMHG | BODY MASS INDEX: 20.57 KG/M2 | OXYGEN SATURATION: 96 % | WEIGHT: 128 LBS | TEMPERATURE: 97.6 F | HEIGHT: 66 IN | HEART RATE: 80 BPM | DIASTOLIC BLOOD PRESSURE: 80 MMHG

## 2023-11-14 DIAGNOSIS — J40 BRONCHITIS: Primary | ICD-10-CM

## 2023-11-14 PROCEDURE — 99213 OFFICE O/P EST LOW 20 MIN: CPT | Performed by: NURSE PRACTITIONER

## 2023-11-14 RX ORDER — DEXTROMETHORPHAN HYDROBROMIDE AND PROMETHAZINE HYDROCHLORIDE 15; 6.25 MG/5ML; MG/5ML
5 SYRUP ORAL 4 TIMES DAILY PRN
Qty: 118 ML | Refills: 0 | Status: SHIPPED | OUTPATIENT
Start: 2023-11-14

## 2023-11-14 RX ORDER — PREDNISONE 10 MG/1
TABLET ORAL
Qty: 20 TABLET | Refills: 0 | Status: SHIPPED | OUTPATIENT
Start: 2023-11-14 | End: 2023-11-21

## 2023-11-14 NOTE — PROGRESS NOTES
Name: Eris Garcia      : 1995      MRN: [id number]  Encounter Provider: MICHAEL Avelar  Encounter Date: 2023   Encounter department: [address]     1. Bronchitis  Assessment & Plan:  Prednisone taper as directed  Promethazine DM as needed cough  Increase fluids  Nasal saline spray  Encouraged smoking cessation    Orders:  -     predniSONE 10 mg tablet; Take 4 tablets (40 mg total) by mouth daily for 2 days, THEN 3 tablets (30 mg total) daily for 2 days, THEN 2 tablets (20 mg total) daily for 2 days, THEN 1 tablet (10 mg total) daily for 2 days. -     promethazine-dextromethorphan (PHENERGAN-DM) 6.25-15 mg/5 mL oral syrup; Take 5 mL by mouth 4 (four) times a day as needed for cough      Depression Screening and Follow-up Plan: Patient was screened for depression during today's encounter. They screened negative with a PHQ-2 score of 0. Subjective      Started 2 weeks ago with cough and runny nose. Cough is now getting worse at night time. Productive cough. Has been using inhaler without much relief. No fevers. No sore thraot, no headaches, no ear pain. No abd pain,n v, d. Review of Systems   Constitutional: Negative. HENT:  Positive for rhinorrhea. Negative for congestion, ear discharge, ear pain, postnasal drip, sinus pressure, sinus pain and sore throat. Respiratory:  Positive for cough, shortness of breath and wheezing. Cardiovascular: Negative. Gastrointestinal: Negative. Skin:  Negative for rash. Neurological:  Negative for headaches. Hematological: Negative.         Current Outpatient Medications on File Prior to Visit   Medication Sig   • ketoconazole (NIZORAL) 2 % cream Apply topically daily (Patient not taking: Reported on 2023)       Objective     /80   Pulse 80   Temp 97.6 °F (36.4 °C) (Tympanic)   Ht 5' 6" (1.676 m)   Wt 58.1 kg (128 lb)   SpO2 96%   BMI 20.66 kg/m²     Physical Exam  Vitals and nursing note reviewed. Constitutional:       General: He is not in acute distress. Appearance: Normal appearance. HENT:      Head: Normocephalic. Right Ear: Tympanic membrane, ear canal and external ear normal.      Left Ear: Tympanic membrane, ear canal and external ear normal.      Nose: Rhinorrhea present. Mouth/Throat:      Mouth: Mucous membranes are moist.      Pharynx: Oropharynx is clear. Posterior oropharyngeal erythema present. Eyes:      Conjunctiva/sclera: Conjunctivae normal.      Pupils: Pupils are equal, round, and reactive to light. Cardiovascular:      Rate and Rhythm: Normal rate and regular rhythm. Heart sounds: Normal heart sounds. Pulmonary:      Breath sounds: Wheezing and rhonchi present. Abdominal:      General: Abdomen is flat. Bowel sounds are normal.      Palpations: Abdomen is soft. Musculoskeletal:      Right lower leg: No edema. Left lower leg: No edema. Lymphadenopathy:      Cervical: No cervical adenopathy. Skin:     Findings: No rash. Neurological:      Mental Status: He is alert and oriented to person, place, and time.    Psychiatric:         Mood and Affect: Mood normal.         Behavior: Behavior normal.       Obi Caruso

## 2023-11-14 NOTE — ASSESSMENT & PLAN NOTE
Prednisone taper as directed  Promethazine DM as needed cough  Increase fluids  Nasal saline spray  Encouraged smoking cessation

## 2023-11-17 ENCOUNTER — TELEPHONE (OUTPATIENT)
Dept: FAMILY MEDICINE CLINIC | Facility: CLINIC | Age: 28
End: 2023-11-17

## 2023-11-17 DIAGNOSIS — J40 BRONCHITIS: Primary | ICD-10-CM

## 2023-11-17 RX ORDER — AMOXICILLIN AND CLAVULANATE POTASSIUM 875; 125 MG/1; MG/1
1 TABLET, FILM COATED ORAL EVERY 12 HOURS SCHEDULED
Qty: 20 TABLET | Refills: 0 | Status: SHIPPED | OUTPATIENT
Start: 2023-11-17 | End: 2023-11-27

## 2023-11-17 NOTE — TELEPHONE ENCOUNTER
Mom called in today stating that the patient is still coughing really badly and now he is spitting up yellowing mucous. Not improving. No cough syrup left. "He's no better than when he went to you's on Monday so. .."    Wondering if there is something else that we could send in for him to help with his symptoms.     Please advise    [facility] [phone]

## 2023-12-06 ENCOUNTER — OFFICE VISIT (OUTPATIENT)
Dept: UROLOGY | Facility: CLINIC | Age: 28
End: 2023-12-06
Payer: COMMERCIAL

## 2023-12-06 VITALS
WEIGHT: 127 LBS | HEART RATE: 69 BPM | DIASTOLIC BLOOD PRESSURE: 72 MMHG | HEIGHT: 66 IN | BODY MASS INDEX: 20.41 KG/M2 | SYSTOLIC BLOOD PRESSURE: 122 MMHG | OXYGEN SATURATION: 99 %

## 2023-12-06 DIAGNOSIS — Z30.09 VASECTOMY EVALUATION: Primary | ICD-10-CM

## 2023-12-06 PROCEDURE — 99203 OFFICE O/P NEW LOW 30 MIN: CPT | Performed by: PHYSICIAN ASSISTANT

## 2023-12-06 RX ORDER — DIAZEPAM 5 MG/1
TABLET ORAL
Qty: 1 TABLET | Refills: 0 | Status: SHIPPED | OUTPATIENT
Start: 2023-12-06

## 2023-12-06 NOTE — PROGRESS NOTES
1. Vasectomy evaluation  diazepam (VALIUM) 5 mg tablet            Assessment and plan:       We had a long discussion regarding the options for birth control. I told the patient that vasectomy is considered to be a permanent surgical sterilization procedure. We spoke about other options including the possibility of vasectomy reversal at a later time. He understands that vasectomy confers no immunity to STDs. I also told him that according to our present knowledge, there is no causal relationship between vasectomy and subsequent development of prostate cancer or testicular cancer. No change in libido erection or ejaculation. We spoke about the potential complications. The most common one in the short term is scrotal hematoma and infection, which rarely requires re-operation. Additionally, he can react to the anesthetic, develop scrotal swelling, have pain or skin bruising. We spoke about post procedure care to try to minimize this complication. I also asked him to refrain from aspirin or fish oil products and alcohol prior to the procedure. The long-term complications include but are not limited to vasectomy failure by recanalization, chronic epididymal discomfort, pain, among other possibilities. I described to him how this procedure is normally performed in an office setting and he understands that if anesthesia is desired, this can be performed for him in an outpatient operative setting. Finally, he understands that following vasectomy, he’ll need to use other means of birth control until he’s had semen analyses that demonstrate the absence of sperm. He understands it will be his responsibility to submit these semen specimens and call our office for the results. I told him again that recanalization is a small but real possibility, and if he is ever concerned about it he can submit another semen specimen for analysis.       After discussing the risks, benefits, possible complications and alternatives, informed consents were obtained. Diazepam was prescribed, and review dosing, adverse effects and timing of the procedure. He will return in the near future for the procedure. Chief Complaint     Desire for vasectomy    History of Present Illness     Odella Boast is a 33 y.o. male referred for evaluation of vasectomy. He has 2 biological children. He states that he has come to the decision he does not desire any additional children. He has no prior past medical, past surgical, or past urologic history    Review of Systems     Review of Systems   Constitutional: Negative for activity change and fatigue. HENT: Negative for congestion. Eyes: Negative for visual disturbance. Respiratory: Negative for shortness of breath and wheezing. Cardiovascular: Negative for chest pain and leg swelling. Gastrointestinal: Negative for abdominal pain. Endocrine: Negative for polyuria. Genitourinary: Negative for dysuria, flank pain, hematuria and urgency. Musculoskeletal: Negative for back pain. Allergic/Immunologic: Negative for immunocompromised state. Neurological: Negative for dizziness and numbness. Psychiatric/Behavioral: Negative for dysphoric mood. All other systems reviewed and are negative. Allergies     No Known Allergies    Physical Exam     Physical Exam patient was accompanied by his mother  Constitutional: He is oriented to person, place, and time. He appears well-developed and well-nourished. No distress. Stigmata of tobacco use   HENT:   Head: Normocephalic and atraumatic. Eyes: EOM are normal.   Neck: Normal range of motion. Cardiovascular:   Negative lower extremity edema   Pulmonary/Chest: Effort normal and breath sounds normal.   Abdominal: Soft. Genitourinary:   Genitourinary Comments: Vas deferens are palpable bilaterally. Musculoskeletal: Normal range of motion. Neurological: He is alert and oriented to person, place, and time. Skin: Skin is warm. Psychiatric: He has a normal mood and affect. His behavior is normal.         Vital Signs  Vitals:    12/06/23 0939   BP: 122/72   BP Location: Left arm   Patient Position: Sitting   Cuff Size: Adult   Pulse: 69   SpO2: 99%   Weight: 57.6 kg (127 lb)   Height: 5' 6" (1.676 m)         Current Medications       Current Outpatient Medications:     diazepam (VALIUM) 5 mg tablet, Take 1 tablet PO 1 hour prior to surgery, Disp: 1 tablet, Rfl: 0      Active Problems     Patient Active Problem List   Diagnosis    Cigarette nicotine dependence without complication    History of asthma    Occipital fracture (HCC)    Cellulitis of right foot    Athlete's foot on right    Bronchitis         Past Medical History     Past Medical History:   Diagnosis Date    Asthma     Lyme disease     Lyme disease     Personal history of methicillin resistant Staphylococcus aureus     Seizures ([address] [facility])     childhood         Surgical History     Past Surgical History:   Procedure Laterality Date    ANKLE SURGERY      ORIF ANKLE FRACTURE BIMALLEOLAR           Family History     Family History   Problem Relation Age of Onset    Hypertension Mother     Asthma Father     Melanoma Paternal Grandfather     Diabetes Maternal Aunt          Social History     Social History     Social History     Tobacco Use   Smoking Status Every Day    Packs/day: 1.00    Years: 3.00    Total pack years: 3.00    Types: Cigarettes   Smokeless Tobacco Never       Portions of the record may have been created with voice recognition software. Occasional wrong word or "sound a like" substitutions may have occurred due to the inherent limitations of voice recognition software. Read the chart carefully and recognize, using context, where substitutions have occurred.

## 2024-02-12 ENCOUNTER — OFFICE VISIT (OUTPATIENT)
Dept: FAMILY MEDICINE CLINIC | Facility: CLINIC | Age: 29
End: 2024-02-12
Payer: COMMERCIAL

## 2024-02-12 VITALS
WEIGHT: 133 LBS | OXYGEN SATURATION: 97 % | HEART RATE: 74 BPM | SYSTOLIC BLOOD PRESSURE: 116 MMHG | TEMPERATURE: 98 F | HEIGHT: 66 IN | DIASTOLIC BLOOD PRESSURE: 78 MMHG | BODY MASS INDEX: 21.38 KG/M2

## 2024-02-12 DIAGNOSIS — Z13.0 SCREENING FOR ENDOCRINE, NUTRITIONAL, METABOLIC AND IMMUNITY DISORDER: ICD-10-CM

## 2024-02-12 DIAGNOSIS — Z13.228 SCREENING FOR ENDOCRINE, NUTRITIONAL, METABOLIC AND IMMUNITY DISORDER: ICD-10-CM

## 2024-02-12 DIAGNOSIS — Z13.29 SCREENING FOR THYROID DISORDER: ICD-10-CM

## 2024-02-12 DIAGNOSIS — Z13.29 SCREENING FOR ENDOCRINE, NUTRITIONAL, METABOLIC AND IMMUNITY DISORDER: ICD-10-CM

## 2024-02-12 DIAGNOSIS — F17.210 CIGARETTE NICOTINE DEPENDENCE WITHOUT COMPLICATION: ICD-10-CM

## 2024-02-12 DIAGNOSIS — J20.9 ACUTE BRONCHITIS, UNSPECIFIED ORGANISM: Primary | ICD-10-CM

## 2024-02-12 DIAGNOSIS — Z13.0 SCREENING FOR IRON DEFICIENCY ANEMIA: ICD-10-CM

## 2024-02-12 DIAGNOSIS — Z00.00 ANNUAL PHYSICAL EXAM: ICD-10-CM

## 2024-02-12 DIAGNOSIS — Z13.21 SCREENING FOR ENDOCRINE, NUTRITIONAL, METABOLIC AND IMMUNITY DISORDER: ICD-10-CM

## 2024-02-12 DIAGNOSIS — Z13.220 SCREENING FOR LIPOID DISORDERS: ICD-10-CM

## 2024-02-12 PROCEDURE — 99395 PREV VISIT EST AGE 18-39: CPT | Performed by: NURSE PRACTITIONER

## 2024-02-12 PROCEDURE — 99213 OFFICE O/P EST LOW 20 MIN: CPT | Performed by: NURSE PRACTITIONER

## 2024-02-12 RX ORDER — AMOXICILLIN AND CLAVULANATE POTASSIUM 875; 125 MG/1; MG/1
1 TABLET, FILM COATED ORAL EVERY 12 HOURS SCHEDULED
Qty: 14 TABLET | Refills: 0 | Status: SHIPPED | OUTPATIENT
Start: 2024-02-12 | End: 2024-02-19

## 2024-02-12 RX ORDER — DEXTROMETHORPHAN HYDROBROMIDE AND PROMETHAZINE HYDROCHLORIDE 15; 6.25 MG/5ML; MG/5ML
5 SYRUP ORAL 4 TIMES DAILY PRN
Qty: 118 ML | Refills: 0 | Status: SHIPPED | OUTPATIENT
Start: 2024-02-12

## 2024-02-12 RX ORDER — PREDNISONE 10 MG/1
TABLET ORAL DAILY
Qty: 20 TABLET | Refills: 0 | Status: SHIPPED | OUTPATIENT
Start: 2024-02-12 | End: 2024-02-19

## 2024-02-12 NOTE — ASSESSMENT & PLAN NOTE
Prednisone taper as directed  Augmentin twice daily for 7 days  Promethazine DM as needed cough  Increase fluids  Nasal saline spray  Encouraged smoking cessation

## 2024-02-12 NOTE — PROGRESS NOTES
ADULT ANNUAL PHYSICAL  [facility]    NAME: Scott Bowen  AGE: 28 y.o. SEX: male  : 1995     DATE: 2024     Assessment and Plan:     Problem List Items Addressed This Visit        Respiratory    Acute bronchitis - Primary     Prednisone taper as directed  Augmentin twice daily for 7 days  Promethazine DM as needed cough  Increase fluids  Nasal saline spray  Encouraged smoking cessation         Relevant Medications    promethazine-dextromethorphan (PHENERGAN-DM) 6.25-15 mg/5 mL oral syrup    predniSONE 10 mg tablet    amoxicillin-clavulanate (AUGMENTIN) 875-125 mg per tablet       Other    Cigarette nicotine dependence without complication     Encouraged smoking cessation  Not ready to quit        Other Visit Diagnoses     Annual physical exam        Screening for lipoid disorders        Relevant Orders    Lipid Panel with Direct LDL reflex    Screening for iron deficiency anemia        Relevant Orders    CBC and differential    Screening for endocrine, nutritional, metabolic and immunity disorder        Relevant Orders    Comprehensive metabolic panel    Screening for thyroid disorder        Relevant Orders    TSH, 3rd generation with Free T4 reflex          Immunizations and preventive care screenings were discussed with patient today. Appropriate education was printed on patient's after visit summary.    Counseling:  Dental Health: discussed importance of regular tooth brushing, flossing, and dental visits.  Injury prevention: discussed safety/seat belts, safety helmets, smoke detectors, carbon dioxide detectors, and smoking near bedding or upholstery.  Exercise: the importance of regular exercise/physical activity was discussed. Recommend exercise 3-5 times per week for at least 30 minutes.       Depression Screening and Follow-up Plan: Patient was screened for depression during today's encounter. They screened negative with a PHQ-2 score of  0.    Tobacco Cessation Counseling: Tobacco cessation counseling was not provided. The patient is sincerely urged to quit consumption of tobacco. He is not ready to quit tobacco.         No follow-ups on file.     Chief Complaint:     Chief Complaint   Patient presents with   • Follow-up     has not been feeling good for a week, has cough  at night  spitting up phlegm and discharge coming from nose,  slight congestion, no fever, OTC none, at home covid test neg      Patient advised schedule physical at check out   Blood work pended         History of Present Illness:     Adult Annual Physical   Patient here for a comprehensive physical exam. The patient reports problems - cough .    Cough and congestion for last week, worse at night, no fevers. + smoker, + congestion, no sore throat, no ear pain, daughter sick with URI symptoms last week. Slight wheeze worse at night time.   No OTC    Diet and Physical Activity  Diet/Nutrition: well balanced diet.   Exercise: no formal exercise and very active  .      Depression Screening  PHQ-2/9 Depression Screening    Little interest or pleasure in doing things: 0 - not at all  Feeling down, depressed, or hopeless: 0 - not at all  PHQ-2 Score: 0  PHQ-2 Interpretation: Negative depression screen       General Health  Sleep: sleeps well.   Hearing: normal - bilateral.  Vision: no vision problems.   Dental: regular dental visits and brushes teeth twice daily.        Health  History of STDs?: no.         Review of Systems:     Review of Systems   Constitutional:  Positive for fatigue. Negative for chills and fever.   HENT:  Positive for congestion and rhinorrhea. Negative for ear discharge, ear pain, postnasal drip, sinus pressure, sinus pain and sore throat.    Eyes:  Negative for pain and visual disturbance.   Respiratory:  Positive for cough, shortness of breath and wheezing.    Cardiovascular: Negative.  Negative for chest pain and palpitations.   Gastrointestinal: Negative.   Negative for abdominal pain and vomiting.   Genitourinary:  Negative for dysuria and hematuria.   Musculoskeletal:  Negative for arthralgias and back pain.   Skin:  Negative for color change and rash.   Neurological:  Negative for seizures, syncope and headaches.   Hematological: Negative.  Negative for adenopathy.   Psychiatric/Behavioral: Negative.     All other systems reviewed and are negative.     Past Medical History:     Past Medical History:   Diagnosis Date   • Asthma    • Kidney stone 10 years ago   • Lyme disease    • Lyme disease    • Personal history of methicillin resistant Staphylococcus aureus    • Seizures (HCC)     childhood      Past Surgical History:     Past Surgical History:   Procedure Laterality Date   • ANKLE SURGERY     • ORIF ANKLE FRACTURE BIMALLEOLAR        Social History:     Social History     Socioeconomic History   • Marital status: Single     Spouse name: None   • Number of children: None   • Years of education: None   • Highest education level: None   Occupational History   • None   Tobacco Use   • Smoking status: Every Day     Current packs/day: 1.00     Average packs/day: 1 pack/day for 10.0 years (10.0 ttl pk-yrs)     Types: Cigarettes   • Smokeless tobacco: Never   Vaping Use   • Vaping status: Never Used   Substance and Sexual Activity   • Alcohol use: Yes     Comment: occ   • Drug use: Never     Types: Marijuana   • Sexual activity: Yes     Partners: Female   Other Topics Concern   • None   Social History Narrative   • None     Social Determinants of Health     Financial Resource Strain: Not on file   Food Insecurity: Not on file   Transportation Needs: Not on file   Physical Activity: Not on file   Stress: Not on file   Social Connections: Not on file   Intimate Partner Violence: Not on file   Housing Stability: Not on file      Family History:     Family History   Problem Relation Age of Onset   • Hypertension Mother    • Diabetes Mother    • Asthma Father    • Melanoma  "Paternal Grandfather    • Diabetes Maternal Aunt       Current Medications:     Current Outpatient Medications   Medication Sig Dispense Refill   • amoxicillin-clavulanate (AUGMENTIN) 875-125 mg per tablet Take 1 tablet by mouth every 12 (twelve) hours for 7 days 14 tablet 0   • predniSONE 10 mg tablet Take 4 tablets (40 mg total) by mouth daily for 2 days, THEN 3 tablets (30 mg total) daily for 2 days, THEN 2 tablets (20 mg total) daily for 2 days, THEN 1 tablet (10 mg total) daily for 2 days. 20 tablet 0   • promethazine-dextromethorphan (PHENERGAN-DM) 6.25-15 mg/5 mL oral syrup Take 5 mL by mouth 4 (four) times a day as needed for cough 118 mL 0   • diazepam (VALIUM) 5 mg tablet Take 1 tablet PO 1 hour prior to surgery (Patient not taking: Reported on 2/12/2024) 1 tablet 0     No current facility-administered medications for this visit.      Allergies:     No Known Allergies   Physical Exam:     /78   Pulse 74   Temp 98 °F (36.7 °C)   Ht 5' 6\" (1.676 m)   Wt 60.3 kg (133 lb)   SpO2 97%   BMI 21.47 kg/m²     Physical Exam  Vitals and nursing note reviewed.   Constitutional:       General: He is not in acute distress.     Appearance: Normal appearance. He is well-developed and normal weight.   HENT:      Head: Normocephalic and atraumatic.      Right Ear: Tympanic membrane, ear canal and external ear normal.      Left Ear: Tympanic membrane, ear canal and external ear normal.      Nose: Congestion and rhinorrhea present.      Mouth/Throat:      Mouth: Mucous membranes are moist.      Pharynx: Oropharynx is clear. Posterior oropharyngeal erythema present.   Eyes:      Conjunctiva/sclera: Conjunctivae normal.      Pupils: Pupils are equal, round, and reactive to light.   Cardiovascular:      Rate and Rhythm: Normal rate and regular rhythm.      Pulses:           Radial pulses are 2+ on the right side and 2+ on the left side.      Heart sounds: No murmur heard.  Pulmonary:      Effort: Pulmonary effort is " normal. No respiratory distress.      Breath sounds: Wheezing (slight wheeze expiratory) and rhonchi present.   Abdominal:      General: Bowel sounds are normal.      Palpations: Abdomen is soft.      Tenderness: There is no abdominal tenderness.   Musculoskeletal:      Cervical back: Neck supple.      Right lower leg: No edema.      Left lower leg: No edema.   Lymphadenopathy:      Cervical: No cervical adenopathy.   Skin:     General: Skin is warm and dry.   Neurological:      General: No focal deficit present.      Mental Status: He is alert and oriented to person, place, and time.   Psychiatric:         Mood and Affect: Mood normal.         Behavior: Behavior normal.          MICHAEL Murry   [facility]

## 2024-03-18 ENCOUNTER — TELEPHONE (OUTPATIENT)
Dept: UROLOGY | Facility: CLINIC | Age: 29
End: 2024-03-18

## 2024-03-21 NOTE — TELEPHONE ENCOUNTER
Spoke to patient 3/21/24. Will be at the office on Friday to complete. Was told to ask for Samaria

## 2024-03-25 NOTE — TELEPHONE ENCOUNTER
Called patient regarding insurance form. As of 3/25/24 patient has not come in to sign form. Message left that he needs to come in today to sign or his scheduled procedure will need to be rescheduled.

## 2024-03-25 NOTE — TELEPHONE ENCOUNTER
PT mom  called to ask about consent.  Informed her that pt needs to sign a consent form prior to procedure. Advised to come to the office between 9 and 930 to sign consent and then take the ativan after consent is signed.    Verbalized understanding,

## 2024-03-26 ENCOUNTER — PROCEDURE VISIT (OUTPATIENT)
Dept: UROLOGY | Facility: CLINIC | Age: 29
End: 2024-03-26
Payer: COMMERCIAL

## 2024-03-26 VITALS
SYSTOLIC BLOOD PRESSURE: 130 MMHG | WEIGHT: 129 LBS | HEIGHT: 66 IN | DIASTOLIC BLOOD PRESSURE: 74 MMHG | BODY MASS INDEX: 20.73 KG/M2 | OXYGEN SATURATION: 97 % | HEART RATE: 78 BPM

## 2024-03-26 DIAGNOSIS — Z30.09 VASECTOMY EVALUATION: Primary | ICD-10-CM

## 2024-03-26 PROCEDURE — 88302 TISSUE EXAM BY PATHOLOGIST: CPT | Performed by: PATHOLOGY

## 2024-03-26 PROCEDURE — 55250 REMOVAL OF SPERM DUCT(S): CPT | Performed by: UROLOGY

## 2024-03-26 NOTE — PROGRESS NOTES
Procedures      No Scalpel Vasectomy Procedure Note    Indications: 28 y.o.  y.o. male desiring permanent sterilization    Pre-operative Diagnosis: Undesired fertility    Post-operative Diagnosis: Undesired fertility    Anesthesia: Lidocaine 2% without epinephrine      Procedure Details     The risks and benefits of the procedure were discussed at the pre-procedure consultation, and written, informed consent obtained.    Premedicated with Valium 10 mgm po 60 minutes prior to procedure.    The scrotum was palpated with both testes normal in size and position, no masses palpitated. The scrotum was cleansed with warm Betadine and draped in the usual sterile manner.     A vasal sheath block was performed on both the left and right vas.  After adequate anesthesia was established, a small perforation was made in the skin and the left vas was isolated with the ring forceps, dissected free and delivered through the skin perforation.  The left vas was divided, approximately 1.5 cm portion removed, and each end of the vas was cauterized and suture ligated.  The ends of the vas were replaced in the scrotum through the puncture site.  The right vas was then isolated, divided, cauterized and ligated in a similar fashion.  Midportions removed were sent to pathology to confirm.    Any bleeding was controlled with electrocautery.  The puncture site was dry when the procedure was completed.  After discussion with the patient, the puncture site was sutured using single 5-0 chromic suture in figure 8 fashion.     Specimen:   1.  Right vas deferens  2.  Left vas deferens    Condition: Stable    Complications: none    Plan:        He did very well with the procedure today. Postprocedural instructions were provided, including instructions, scheduling information, and the specimen cup for his postprocedural semenanalysis.  He was instructed to perform this at 6 weeks and 2 call my office after the specimen is submitted to review the  results by phone.  He was instructed to continue his current methods of contraception until that time.

## 2024-04-01 PROCEDURE — 88302 TISSUE EXAM BY PATHOLOGIST: CPT | Performed by: PATHOLOGY

## 2024-12-12 ENCOUNTER — OFFICE VISIT (OUTPATIENT)
Dept: FAMILY MEDICINE CLINIC | Facility: CLINIC | Age: 29
End: 2024-12-12
Payer: COMMERCIAL

## 2024-12-12 VITALS
BODY MASS INDEX: 21.21 KG/M2 | OXYGEN SATURATION: 97 % | HEART RATE: 70 BPM | HEIGHT: 66 IN | TEMPERATURE: 98.2 F | SYSTOLIC BLOOD PRESSURE: 102 MMHG | WEIGHT: 132 LBS | DIASTOLIC BLOOD PRESSURE: 42 MMHG

## 2024-12-12 DIAGNOSIS — J20.9 ACUTE BRONCHITIS, UNSPECIFIED ORGANISM: ICD-10-CM

## 2024-12-12 PROCEDURE — 99213 OFFICE O/P EST LOW 20 MIN: CPT

## 2024-12-12 RX ORDER — DEXTROMETHORPHAN HYDROBROMIDE AND PROMETHAZINE HYDROCHLORIDE 15; 6.25 MG/5ML; MG/5ML
5 SYRUP ORAL 4 TIMES DAILY PRN
Qty: 118 ML | Refills: 0 | Status: SHIPPED | OUTPATIENT
Start: 2024-12-12

## 2024-12-12 RX ORDER — PREDNISONE 10 MG/1
10 TABLET ORAL SEE ADMIN INSTRUCTIONS
Qty: 20 TABLET | Refills: 0 | Status: SHIPPED | OUTPATIENT
Start: 2024-12-12

## 2024-12-12 RX ORDER — AZITHROMYCIN 250 MG/1
TABLET, FILM COATED ORAL
Qty: 6 TABLET | Refills: 0 | Status: SHIPPED | OUTPATIENT
Start: 2024-12-12 | End: 2024-12-17

## 2024-12-12 NOTE — ASSESSMENT & PLAN NOTE
-zpack, prednisone taper   -Supportive care: phergan-dm cough syrup, tylenol/ibuprofen prn pain/fever, maintain adequate hydration, rest, steamy showers/humidifier, honey for sore throat    Orders:    promethazine-dextromethorphan (PHENERGAN-DM) 6.25-15 mg/5 mL oral syrup; Take 5 mL by mouth 4 (four) times a day as needed for cough    azithromycin (Zithromax) 250 mg tablet; Take 2 tablets (500 mg total) by mouth daily for 1 day, THEN 1 tablet (250 mg total) daily for 4 days.    predniSONE 10 mg tablet; Take 1 tablet (10 mg total) by mouth see administration instructions Take 40mg (4 tablets) for 2 days, then take 30mg (3 tablets) for 2 days, then take 20mg (2 tablets) for 2 days, then take 10mg (1 tablet) for 2 days.

## 2025-07-23 ENCOUNTER — HOSPITAL ENCOUNTER (OUTPATIENT)
Facility: HOSPITAL | Age: 30
Setting detail: OBSERVATION
Discharge: HOME | End: 2025-07-24
Attending: SURGERY | Admitting: SURGERY
Payer: COMMERCIAL

## 2025-07-23 ENCOUNTER — APPOINTMENT (EMERGENCY)
Dept: CT IMAGING | Facility: HOSPITAL | Age: 30
End: 2025-07-23
Payer: COMMERCIAL

## 2025-07-23 ENCOUNTER — APPOINTMENT (EMERGENCY)
Dept: RADIOLOGY | Facility: HOSPITAL | Age: 30
End: 2025-07-23
Payer: COMMERCIAL

## 2025-07-23 DIAGNOSIS — S36.029A: ICD-10-CM
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
## 2025-07-23 LAB
ABO GROUP BLD: NORMAL
ABO GROUP BLD: NORMAL
ALBUMIN SERPL BCG-MCNC: 3.9 G/DL (ref 3.5–5)
ALP SERPL-CCNC: 62 U/L (ref 34–104)
ALT SERPL W P-5'-P-CCNC: 14 U/L (ref 7–52)
ANION GAP SERPL CALCULATED.3IONS-SCNC: 8 MMOL/L (ref 4–13)
APTT PPP: 27 SECONDS (ref 23–34)
AST SERPL W P-5'-P-CCNC: 28 U/L (ref 13–39)
BASE EXCESS BLDA CALC-SCNC: 1 MMOL/L (ref -2–3)
BASOPHILS # BLD AUTO: 0.03 THOUSANDS/ÂΜL (ref 0–0.1)
BASOPHILS NFR BLD AUTO: 0 % (ref 0–1)
BILIRUB SERPL-MCNC: 0.65 MG/DL (ref 0.2–1)
BLD GP AB SCN SERPL QL: NEGATIVE
BUN SERPL-MCNC: 9 MG/DL (ref 5–25)
CA-I BLD-SCNC: 1.14 MMOL/L (ref 1.12–1.32)
CALCIUM SERPL-MCNC: 8.3 MG/DL (ref 8.4–10.2)
CHLORIDE SERPL-SCNC: 104 MMOL/L (ref 96–108)
CO2 SERPL-SCNC: 25 MMOL/L (ref 21–32)
CREAT SERPL-MCNC: 0.81 MG/DL (ref 0.6–1.3)
EOSINOPHIL # BLD AUTO: 0.09 THOUSAND/ÂΜL (ref 0–0.61)
EOSINOPHIL NFR BLD AUTO: 1 % (ref 0–6)
ERYTHROCYTE [DISTWIDTH] IN BLOOD BY AUTOMATED COUNT: 12.8 % (ref 11.6–15.1)
GFR SERPL CREATININE-BSD FRML MDRD: 120 ML/MIN/1.73SQ M
GLUCOSE SERPL-MCNC: 103 MG/DL (ref 65–140)
GLUCOSE SERPL-MCNC: 92 MG/DL (ref 65–140)
HCO3 BLDA-SCNC: 25.5 MMOL/L (ref 24–30)
HCT VFR BLD AUTO: 41.2 % (ref 36.5–49.3)
HCT VFR BLD AUTO: 44.7 % (ref 36.5–49.3)
HCT VFR BLD CALC: 45 % (ref 36.5–49.3)
HGB BLD-MCNC: 14.2 G/DL (ref 12–17)
HGB BLD-MCNC: 15.4 G/DL (ref 12–17)
HGB BLDA-MCNC: 15.3 G/DL (ref 12–17)
IMM GRANULOCYTES # BLD AUTO: 0.03 THOUSAND/UL (ref 0–0.2)
IMM GRANULOCYTES NFR BLD AUTO: 0 % (ref 0–2)
INR PPP: 1 (ref 0.85–1.19)
LYMPHOCYTES # BLD AUTO: 1.92 THOUSANDS/ÂΜL (ref 0.6–4.47)
LYMPHOCYTES NFR BLD AUTO: 17 % (ref 14–44)
MCH RBC QN AUTO: 31.4 PG (ref 26.8–34.3)
MCHC RBC AUTO-ENTMCNC: 34.5 G/DL (ref 31.4–37.4)
MCV RBC AUTO: 91 FL (ref 82–98)
MONOCYTES # BLD AUTO: 1.13 THOUSAND/ÂΜL (ref 0.17–1.22)
MONOCYTES NFR BLD AUTO: 10 % (ref 4–12)
NEUTROPHILS # BLD AUTO: 8.1 THOUSANDS/ÂΜL (ref 1.85–7.62)
NEUTS SEG NFR BLD AUTO: 72 % (ref 43–75)
NRBC BLD AUTO-RTO: 0 /100 WBCS
PCO2 BLD: 27 MMOL/L (ref 21–32)
PCO2 BLD: 40.1 MM HG (ref 42–50)
PH BLD: 7.41 [PH] (ref 7.3–7.4)
PLATELET # BLD AUTO: 253 THOUSANDS/UL (ref 149–390)
PMV BLD AUTO: 9 FL (ref 8.9–12.7)
PO2 BLD: 31 MM HG (ref 35–45)
POTASSIUM BLD-SCNC: 3.6 MMOL/L (ref 3.5–5.3)
POTASSIUM SERPL-SCNC: 3.7 MMOL/L (ref 3.5–5.3)
PROT SERPL-MCNC: 6.4 G/DL (ref 6.4–8.4)
PROTHROMBIN TIME: 13.9 SECONDS (ref 12.3–15)
RBC # BLD AUTO: 4.52 MILLION/UL (ref 3.88–5.62)
RH BLD: POSITIVE
RH BLD: POSITIVE
SAO2 % BLD FROM PO2: 59 % (ref 60–85)
SODIUM BLD-SCNC: 140 MMOL/L (ref 136–145)
SODIUM SERPL-SCNC: 137 MMOL/L (ref 135–147)
SPECIMEN EXPIRATION DATE: NORMAL
SPECIMEN SOURCE: ABNORMAL
WBC # BLD AUTO: 11.3 THOUSAND/UL (ref 4.31–10.16)

## 2025-07-23 PROCEDURE — 85014 HEMATOCRIT: CPT

## 2025-07-23 PROCEDURE — 70450 CT HEAD/BRAIN W/O DYE: CPT

## 2025-07-23 PROCEDURE — 84132 ASSAY OF SERUM POTASSIUM: CPT

## 2025-07-23 PROCEDURE — 36415 COLL VENOUS BLD VENIPUNCTURE: CPT | Performed by: STUDENT IN AN ORGANIZED HEALTH CARE EDUCATION/TRAINING PROGRAM

## 2025-07-23 PROCEDURE — 72125 CT NECK SPINE W/O DYE: CPT

## 2025-07-23 PROCEDURE — 85730 THROMBOPLASTIN TIME PARTIAL: CPT | Performed by: SURGERY

## 2025-07-23 PROCEDURE — 85610 PROTHROMBIN TIME: CPT | Performed by: SURGERY

## 2025-07-23 PROCEDURE — 82803 BLOOD GASES ANY COMBINATION: CPT

## 2025-07-23 PROCEDURE — 71260 CT THORAX DX C+: CPT

## 2025-07-23 PROCEDURE — 84295 ASSAY OF SERUM SODIUM: CPT

## 2025-07-23 PROCEDURE — 99284 EMERGENCY DEPT VISIT MOD MDM: CPT

## 2025-07-23 PROCEDURE — 82947 ASSAY GLUCOSE BLOOD QUANT: CPT

## 2025-07-23 PROCEDURE — 86850 RBC ANTIBODY SCREEN: CPT | Performed by: STUDENT IN AN ORGANIZED HEALTH CARE EDUCATION/TRAINING PROGRAM

## 2025-07-23 PROCEDURE — 85025 COMPLETE CBC W/AUTO DIFF WBC: CPT | Performed by: SURGERY

## 2025-07-23 PROCEDURE — 86901 BLOOD TYPING SEROLOGIC RH(D): CPT | Performed by: STUDENT IN AN ORGANIZED HEALTH CARE EDUCATION/TRAINING PROGRAM

## 2025-07-23 PROCEDURE — 86900 BLOOD TYPING SEROLOGIC ABO: CPT | Performed by: STUDENT IN AN ORGANIZED HEALTH CARE EDUCATION/TRAINING PROGRAM

## 2025-07-23 PROCEDURE — 85018 HEMOGLOBIN: CPT | Performed by: SURGERY

## 2025-07-23 PROCEDURE — 85014 HEMATOCRIT: CPT | Performed by: SURGERY

## 2025-07-23 PROCEDURE — 80053 COMPREHEN METABOLIC PANEL: CPT | Performed by: SURGERY

## 2025-07-23 PROCEDURE — 74177 CT ABD & PELVIS W/CONTRAST: CPT

## 2025-07-23 PROCEDURE — EDAIR PR ED AIR

## 2025-07-23 PROCEDURE — 82330 ASSAY OF CALCIUM: CPT

## 2025-07-23 PROCEDURE — NC001 PR NO CHARGE: Performed by: SURGERY

## 2025-07-23 RX ORDER — OXYCODONE HYDROCHLORIDE 5 MG/1
5 TABLET ORAL EVERY 4 HOURS PRN
Refills: 0 | Status: DISCONTINUED | OUTPATIENT
Start: 2025-07-23 | End: 2025-07-24 | Stop reason: HOSPADM

## 2025-07-23 RX ORDER — ACETAMINOPHEN 325 MG/1
975 TABLET ORAL EVERY 8 HOURS SCHEDULED
Status: DISCONTINUED | OUTPATIENT
Start: 2025-07-23 | End: 2025-07-24 | Stop reason: HOSPADM

## 2025-07-23 RX ORDER — METHOCARBAMOL 750 MG/1
750 TABLET, FILM COATED ORAL EVERY 6 HOURS
Status: DISCONTINUED | OUTPATIENT
Start: 2025-07-23 | End: 2025-07-24 | Stop reason: HOSPADM

## 2025-07-23 RX ORDER — NICOTINE 21 MG/24HR
1 PATCH, TRANSDERMAL 24 HOURS TRANSDERMAL DAILY
Status: DISCONTINUED | OUTPATIENT
Start: 2025-07-23 | End: 2025-07-24 | Stop reason: HOSPADM

## 2025-07-23 RX ORDER — LIDOCAINE 50 MG/G
1 PATCH TOPICAL DAILY
Status: DISCONTINUED | OUTPATIENT
Start: 2025-07-23 | End: 2025-07-24 | Stop reason: HOSPADM

## 2025-07-23 RX ADMIN — METHOCARBAMOL 750 MG: 750 TABLET ORAL at 14:06

## 2025-07-23 RX ADMIN — LIDOCAINE 1 PATCH: 50 PATCH CUTANEOUS at 14:04

## 2025-07-23 RX ADMIN — ACETAMINOPHEN 975 MG: 325 TABLET ORAL at 21:27

## 2025-07-23 RX ADMIN — OXYCODONE HYDROCHLORIDE 5 MG: 5 TABLET ORAL at 17:59

## 2025-07-23 RX ADMIN — IOHEXOL 85 ML: 350 INJECTION, SOLUTION INTRAVENOUS at 13:03

## 2025-07-23 RX ADMIN — ACETAMINOPHEN 975 MG: 325 TABLET ORAL at 14:05

## 2025-07-23 RX ADMIN — METHOCARBAMOL 750 MG: 750 TABLET ORAL at 19:25
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
## 2025-07-23 NOTE — PROCEDURES
POC FAST US    Date/Time: 7/23/2025 2:03 PM    Performed by: Dawson Montero PA-C  Authorized by: Dawson Montero PA-C    Patient location:  Trauma  Procedure details:     Exam Type:  Diagnostic    Indications: blunt abdominal trauma and blunt chest trauma      Assess for:  Intra-abdominal fluid and pericardial effusion    Technique: FAST      Views obtained:  Heart - Pericardial sac, LUQ - Splenorenal space, Suprapubic - Pouch of Eligio and RUQ - Parker's Pouch    Image quality: diagnostic      Image availability:  Images available in PACS and video obtained  FAST Findings:     RUQ (Hepatorenal) free fluid: absent      LUQ (Splenorenal) free fluid: absent      Suprapubic free fluid: absent      Cardiac wall motion: identified      Pericardial effusion: absent    Interpretation:     Impressions: negative             <<-----Click here for Discharge Medication Review
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
## 2025-07-23 NOTE — ASSESSMENT & PLAN NOTE
- CT CAP: Small splenic laceration measuring 1.8 cm, grade 2. No perisplenic hematoma or hemoperitoneum.   - Vitals stable  - Benign abdominal exam  - Clear liquid diet  - H&H Q6 hrs  - Hold DVT ppx pending stable Hgb  - Serial abdominal exams  - Anticipate IR consult if patient becomes hemodynamically unstable  - Admit for observation med surg

## 2025-07-23 NOTE — PLAN OF CARE
Problem: PAIN - ADULT  Goal: Verbalizes/displays adequate comfort level or baseline comfort level  Description: Interventions:  - Encourage patient to monitor pain and request assistance  - Assess pain using appropriate pain scale  - Administer analgesics as ordered based on type and severity of pain and evaluate response  - Implement non-pharmacological measures as appropriate and evaluate response  - Consider cultural and social influences on pain and pain management  - Notify physician/advanced practitioner if interventions unsuccessful or patient reports new pain  - Educate patient/family on pain management process including their role and importance of  reporting pain   - Provide non-pharmacologic/complimentary pain relief interventions  7/23/2025 1832 by Ana Maria Pang RN  Outcome: Progressing  7/23/2025 1831 by Ana Maria Pang RN  Outcome: Progressing     Problem: INFECTION - ADULT  Goal: Absence or prevention of progression during hospitalization  Description: INTERVENTIONS:  - Assess and monitor for signs and symptoms of infection  - Monitor lab/diagnostic results  - Monitor all insertion sites, i.e. indwelling lines, tubes, and drains  - Monitor endotracheal if appropriate and nasal secretions for changes in amount and color  - [place] appropriate cooling/warming therapies per order  - Administer medications as ordered  - Instruct and encourage patient and family to use good hand hygiene technique  - Identify and instruct in appropriate isolation precautions for identified infection/condition  Outcome: Progressing  Goal: Absence of fever/infection during neutropenic period  Description: INTERVENTIONS:  - Monitor WBC  - Perform strict hand hygiene  - Limit to healthy visitors only  - No plants, dried, fresh or silk flowers with russell in patient room  Outcome: Progressing     Problem: SAFETY ADULT  Goal: Patient will remain free of falls  Description: INTERVENTIONS:  - Educate patient/family on patient  safety including physical limitations  - Instruct patient to call for assistance with activity   - Consider consulting OT/PT to assist with strengthening/mobility based on AM PAC & JH-HLM score  - Consult OT/PT to assist with strengthening/mobility   - Keep Call bell within reach  - Keep bed low and locked with side rails adjusted as appropriate  - Keep care items and personal belongings within reach  - Initiate and maintain comfort rounds  - Make Fall Risk Sign visible to staff  - Offer Toileting every  Hours, in advance of need  - Initiate/Maintain alarm  - Obtain necessary fall risk management equipment:   - Apply yellow socks and bracelet for high fall risk patients  - Consider moving patient to room near nurses station  Outcome: Progressing  Goal: Maintain or return to baseline ADL function  Description: INTERVENTIONS:  -  Assess patient's ability to carry out ADLs; assess patient's baseline for ADL function and identify physical deficits which impact ability to perform ADLs (bathing, care of mouth/teeth, toileting, grooming, dressing, etc.)  - Assess/evaluate cause of self-care deficits   - Assess range of motion  - Assess patient's mobility; develop plan if impaired  - Assess patient's need for assistive devices and provide as appropriate  - Encourage maximum independence but intervene and supervise when necessary  - Involve family in performance of ADLs  - Assess for home care needs following discharge   - Consider OT consult to assist with ADL evaluation and planning for discharge  - Provide patient education as appropriate  - Monitor functional capacity and physical performance, use of AM PAC & JH-HLM   - Monitor gait, balance and fatigue with ambulation    Outcome: Progressing  Goal: Maintains/Returns to pre admission functional level  Description: INTERVENTIONS:  - Perform AM-PAC 6 Click Basic Mobility/ Daily Activity assessment daily.  - Set and communicate daily mobility goal to care team and  patient/family/caregiver.   - Collaborate with rehabilitation services on mobility goals if consulted  - Perform Range of Motion  times a day.  - Reposition patient every  hours.  - Dangle patient  times a day  - Stand patient  times a day  - Ambulate patient times a day  - Out of bed to chair  times a day   - Out of bed for meals times a day  - Out of bed for toileting  - Record patient progress and toleration of activity level   Outcome: Progressing     Problem: DISCHARGE PLANNING  Goal: Discharge to home or other facility with appropriate resources  Description: INTERVENTIONS:  - Identify barriers to discharge w/patient and caregiver  - Arrange for needed discharge resources and transportation as appropriate  - Identify discharge learning needs (meds, wound care, etc.)  - Arrange for interpretive services to assist at discharge as needed  - Refer to Case Management Department for coordinating discharge planning if the patient needs post-hospital services based on physician/advanced practitioner order or complex needs related to functional status, cognitive ability, or social support system  Outcome: Progressing     Problem: Knowledge Deficit  Goal: Patient/family/caregiver demonstrates understanding of disease process, treatment plan, medications, and discharge instructions  Description: Complete learning assessment and assess knowledge base.  Interventions:  - Provide teaching at level of understanding  - Provide teaching via preferred learning methods  Outcome: Progressing
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
## 2025-07-24 ENCOUNTER — TRANSITIONAL CARE MANAGEMENT (OUTPATIENT)
Dept: FAMILY MEDICINE CLINIC | Facility: CLINIC | Age: 30
End: 2025-07-24

## 2025-07-24 VITALS
SYSTOLIC BLOOD PRESSURE: 134 MMHG | OXYGEN SATURATION: 96 % | RESPIRATION RATE: 16 BRPM | WEIGHT: 125 LBS | DIASTOLIC BLOOD PRESSURE: 76 MMHG | BODY MASS INDEX: 20.09 KG/M2 | HEART RATE: 66 BPM | HEIGHT: 66 IN | TEMPERATURE: 98.1 F

## 2025-07-24 PROBLEM — T07.XXXA ABRASIONS OF MULTIPLE SITES: Status: ACTIVE | Noted: 2025-07-24

## 2025-07-24 LAB
HCT VFR BLD AUTO: 43.5 % (ref 36.5–49.3)
HCT VFR BLD AUTO: 45.3 % (ref 36.5–49.3)
HGB BLD-MCNC: 14.6 G/DL (ref 12–17)
HGB BLD-MCNC: 15.5 G/DL (ref 12–17)

## 2025-07-24 PROCEDURE — 99238 HOSP IP/OBS DSCHRG MGMT 30/<: CPT | Performed by: NURSE PRACTITIONER

## 2025-07-24 PROCEDURE — 85014 HEMATOCRIT: CPT | Performed by: SURGERY

## 2025-07-24 PROCEDURE — 85018 HEMOGLOBIN: CPT | Performed by: SURGERY

## 2025-07-24 RX ORDER — ACETAMINOPHEN 325 MG/1
975 TABLET ORAL EVERY 8 HOURS PRN
Start: 2025-07-24

## 2025-07-24 RX ORDER — METHOCARBAMOL 750 MG/1
750 TABLET, FILM COATED ORAL EVERY 6 HOURS PRN
Qty: 42 TABLET | Refills: 0 | Status: SHIPPED | OUTPATIENT
Start: 2025-07-24

## 2025-07-24 RX ORDER — LIDOCAINE 50 MG/G
1 PATCH TOPICAL DAILY
Qty: 5 PATCH | Refills: 0 | Status: SHIPPED | OUTPATIENT
Start: 2025-07-24

## 2025-07-24 RX ORDER — OXYCODONE HYDROCHLORIDE 5 MG/1
5 TABLET ORAL SEE ADMIN INSTRUCTIONS
Qty: 10 TABLET | Refills: 0 | Status: SHIPPED | OUTPATIENT
Start: 2025-07-24 | End: 2025-08-03

## 2025-07-24 RX ADMIN — NICOTINE 1 PATCH: 21 PATCH, EXTENDED RELEASE TRANSDERMAL at 09:36

## 2025-07-24 RX ADMIN — OXYCODONE HYDROCHLORIDE 5 MG: 5 TABLET ORAL at 07:37

## 2025-07-24 RX ADMIN — METHOCARBAMOL 750 MG: 750 TABLET ORAL at 07:37

## 2025-07-24 RX ADMIN — ACETAMINOPHEN 975 MG: 325 TABLET ORAL at 05:22

## 2025-07-24 RX ADMIN — METHOCARBAMOL 750 MG: 750 TABLET ORAL at 02:42
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
## 2025-07-24 NOTE — PLAN OF CARE
Problem: PAIN - ADULT  Goal: Verbalizes/displays adequate comfort level or baseline comfort level  Description: Interventions:  - Encourage patient to monitor pain and request assistance  - Assess pain using appropriate pain scale  - Administer analgesics as ordered based on type and severity of pain and evaluate response  - Implement non-pharmacological measures as appropriate and evaluate response  - Consider cultural and social influences on pain and pain management  - Notify physician/advanced practitioner if interventions unsuccessful or patient reports new pain  - Educate patient/family on pain management process including their role and importance of  reporting pain   - Provide non-pharmacologic/complimentary pain relief interventions  Outcome: Progressing     Problem: INFECTION - ADULT  Goal: Absence or prevention of progression during hospitalization  Description: INTERVENTIONS:  - Assess and monitor for signs and symptoms of infection  - Monitor lab/diagnostic results  - Monitor all insertion sites, i.e. indwelling lines, tubes, and drains  - Monitor endotracheal if appropriate and nasal secretions for changes in amount and color  - [place] appropriate cooling/warming therapies per order  - Administer medications as ordered  - Instruct and encourage patient and family to use good hand hygiene technique  - Identify and instruct in appropriate isolation precautions for identified infection/condition  Outcome: Progressing  Goal: Absence of fever/infection during neutropenic period  Description: INTERVENTIONS:  - Monitor WBC  - Perform strict hand hygiene  - Limit to healthy visitors only  - No plants, dried, fresh or silk flowers with russell in patient room  Outcome: Progressing     Problem: SAFETY ADULT  Goal: Patient will remain free of falls  Description: INTERVENTIONS:  - Educate patient/family on patient safety including physical limitations  - Instruct patient to call for assistance with activity   -  Consider consulting OT/PT to assist with strengthening/mobility based on AM PAC & JH-HLM score  - Consult OT/PT to assist with strengthening/mobility   - Keep Call bell within reach  - Keep bed low and locked with side rails adjusted as appropriate  - Keep care items and personal belongings within reach  - Initiate and maintain comfort rounds  - Make Fall Risk Sign visible to staff  - Offer Toileting every  Hours, in advance of need  - Initiate/Maintain alarm  - Obtain necessary fall risk management equipment:   - Apply yellow socks and bracelet for high fall risk patients  - Consider moving patient to room near nurses station  Outcome: Progressing  Goal: Maintain or return to baseline ADL function  Description: INTERVENTIONS:  -  Assess patient's ability to carry out ADLs; assess patient's baseline for ADL function and identify physical deficits which impact ability to perform ADLs (bathing, care of mouth/teeth, toileting, grooming, dressing, etc.)  - Assess/evaluate cause of self-care deficits   - Assess range of motion  - Assess patient's mobility; develop plan if impaired  - Assess patient's need for assistive devices and provide as appropriate  - Encourage maximum independence but intervene and supervise when necessary  - Involve family in performance of ADLs  - Assess for home care needs following discharge   - Consider OT consult to assist with ADL evaluation and planning for discharge  - Provide patient education as appropriate  - Monitor functional capacity and physical performance, use of AM PAC & JH-HLM   - Monitor gait, balance and fatigue with ambulation    Outcome: Progressing  Goal: Maintains/Returns to pre admission functional level  Description: INTERVENTIONS:  - Perform AM-PAC 6 Click Basic Mobility/ Daily Activity assessment daily.  - Set and communicate daily mobility goal to care team and patient/family/caregiver.   - Collaborate with rehabilitation services on mobility goals if consulted  -  Perform Range of Motion  times a day.  - Reposition patient every  hours.  - Dangle patient  times a day  - Stand patient  times a day  - Ambulate patient times a day  - Out of bed to chair  times a day   - Out of bed for meals  times a day  - Out of bed for toileting  - Record patient progress and toleration of activity level   Outcome: Progressing     Problem: DISCHARGE PLANNING  Goal: Discharge to home or other facility with appropriate resources  Description: INTERVENTIONS:  - Identify barriers to discharge w/patient and caregiver  - Arrange for needed discharge resources and transportation as appropriate  - Identify discharge learning needs (meds, wound care, etc.)  - Arrange for interpretive services to assist at discharge as needed  - Refer to Case Management Department for coordinating discharge planning if the patient needs post-hospital services based on physician/advanced practitioner order or complex needs related to functional status, cognitive ability, or social support system  Outcome: Progressing     Problem: Knowledge Deficit  Goal: Patient/family/caregiver demonstrates understanding of disease process, treatment plan, medications, and discharge instructions  Description: Complete learning assessment and assess knowledge base.  Interventions:  - Provide teaching at level of understanding  - Provide teaching via preferred learning methods  Outcome: Progressing
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
## 2025-07-24 NOTE — PHYSICAL THERAPY NOTE
Physical Therapy Screen Note       07/24/25 1134   Note Type   Note type Screen   Additional Comments referral received for PT eval and tx. Wilda ABRAHAM reports pt has been ambulating independently and will be discharged shortly. inpatient PT is not indicated due to pt's independent mobility status. discontinue PT.     Ramesh Barton, PT
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
